# Patient Record
Sex: FEMALE | Race: WHITE | NOT HISPANIC OR LATINO | URBAN - METROPOLITAN AREA
[De-identification: names, ages, dates, MRNs, and addresses within clinical notes are randomized per-mention and may not be internally consistent; named-entity substitution may affect disease eponyms.]

---

## 2021-10-10 ENCOUNTER — WALK IN (OUTPATIENT)
Dept: URGENT CARE | Age: 21
End: 2021-10-10

## 2021-10-10 VITALS
BODY MASS INDEX: 30.35 KG/M2 | WEIGHT: 193.4 LBS | HEART RATE: 64 BPM | SYSTOLIC BLOOD PRESSURE: 102 MMHG | HEIGHT: 67 IN | DIASTOLIC BLOOD PRESSURE: 60 MMHG | TEMPERATURE: 98.5 F | RESPIRATION RATE: 18 BRPM

## 2021-10-10 DIAGNOSIS — R09.82 POST-NASAL DRIP: ICD-10-CM

## 2021-10-10 DIAGNOSIS — J02.9 SORE THROAT: Primary | ICD-10-CM

## 2021-10-10 LAB
HETEROPH AB SER QL: NEGATIVE
INTERNAL PROCEDURAL CONTROLS ACCEPTABLE: YES
S PYO AG THROAT QL IA.RAPID: NEGATIVE

## 2021-10-10 PROCEDURE — 86308 HETEROPHILE ANTIBODY SCREEN: CPT | Performed by: NURSE PRACTITIONER

## 2021-10-10 PROCEDURE — 87880 STREP A ASSAY W/OPTIC: CPT | Performed by: NURSE PRACTITIONER

## 2021-10-10 PROCEDURE — 99203 OFFICE O/P NEW LOW 30 MIN: CPT | Performed by: NURSE PRACTITIONER

## 2021-10-10 RX ORDER — ALBUTEROL SULFATE 90 UG/1
AEROSOL, METERED RESPIRATORY (INHALATION)
COMMUNITY

## 2021-10-10 RX ORDER — EPINEPHRINE 0.3 MG/.3ML
INJECTION SUBCUTANEOUS
COMMUNITY

## 2021-10-10 RX ORDER — FAMOTIDINE 40 MG/1
TABLET, FILM COATED ORAL
COMMUNITY
Start: 2021-09-02

## 2021-10-10 RX ORDER — CLONAZEPAM 1 MG/1
TABLET ORAL
COMMUNITY

## 2021-10-10 RX ORDER — METHYLPREDNISOLONE 4 MG/1
TABLET ORAL
COMMUNITY
Start: 2021-09-28

## 2021-10-10 RX ORDER — AZELASTINE 1 MG/ML
SPRAY, METERED NASAL
COMMUNITY

## 2021-10-10 RX ORDER — ESCITALOPRAM OXALATE 20 MG/1
TABLET ORAL
COMMUNITY

## 2021-10-10 RX ORDER — NEOMYCIN SULFATE, POLYMYXIN B SULFATE AND DEXAMETHASONE 3.5; 10000; 1 MG/ML; [USP'U]/ML; MG/ML
SUSPENSION/ DROPS OPHTHALMIC
COMMUNITY
Start: 2021-07-28

## 2021-10-10 ASSESSMENT — ENCOUNTER SYMPTOMS
SINUS PRESSURE: 1
SORE THROAT: 1
NEUROLOGICAL NEGATIVE: 1
GASTROINTESTINAL NEGATIVE: 1
EYES NEGATIVE: 1
RESPIRATORY NEGATIVE: 1
ENDOCRINE NEGATIVE: 1
FATIGUE: 1
HEMATOLOGIC/LYMPHATIC NEGATIVE: 1
FEVER: 0
PSYCHIATRIC NEGATIVE: 1

## 2024-11-18 RX ORDER — DEXTROAMPHETAMINE SACCHARATE, AMPHETAMINE ASPARTATE, DEXTROAMPHETAMINE SULFATE AND AMPHETAMINE SULFATE 1.25; 1.25; 1.25; 1.25 MG/1; MG/1; MG/1; MG/1
1 TABLET ORAL 2 TIMES DAILY PRN
COMMUNITY
Start: 2023-06-10

## 2024-11-18 RX ORDER — SPIRONOLACTONE 50 MG/1
50 TABLET, FILM COATED ORAL 2 TIMES DAILY
COMMUNITY
Start: 2024-04-04

## 2024-11-18 RX ORDER — FAMOTIDINE 40 MG/1
40 TABLET, FILM COATED ORAL NIGHTLY
COMMUNITY
Start: 2023-06-02

## 2024-11-18 RX ORDER — ESCITALOPRAM OXALATE 20 MG/1
40 TABLET ORAL NIGHTLY
COMMUNITY
Start: 2023-04-02

## 2024-11-18 RX ORDER — GABAPENTIN 400 MG/1
CAPSULE ORAL 3 TIMES DAILY
COMMUNITY
Start: 2024-09-19

## 2024-11-18 RX ORDER — TOPIRAMATE 50 MG/1
75 TABLET, FILM COATED ORAL DAILY
COMMUNITY

## 2024-11-18 RX ORDER — CYCLOBENZAPRINE HCL 10 MG
10 TABLET ORAL 3 TIMES DAILY PRN
COMMUNITY
Start: 2024-01-16

## 2024-11-18 RX ORDER — FLUTICASONE PROPIONATE 50 MCG
2 SPRAY, SUSPENSION (ML) NASAL 2 TIMES DAILY
COMMUNITY
Start: 2024-01-08

## 2024-11-18 RX ORDER — EUCALYPTUS/PEPPERMINT OIL
1-2 SOLUTION, NON-ORAL NASAL DAILY PRN
COMMUNITY
Start: 2023-12-19

## 2024-11-18 RX ORDER — CLONAZEPAM 1 MG/1
1 TABLET ORAL NIGHTLY
COMMUNITY
Start: 2024-03-29

## 2024-11-19 ENCOUNTER — LAB ENCOUNTER (OUTPATIENT)
Dept: LAB | Facility: HOSPITAL | Age: 24
End: 2024-11-19
Attending: Other
Payer: COMMERCIAL

## 2024-11-19 DIAGNOSIS — Z01.818 PREOP TESTING: ICD-10-CM

## 2024-11-19 LAB
ANTIBODY SCREEN: NEGATIVE
RH BLOOD TYPE: POSITIVE

## 2024-11-19 PROCEDURE — 86850 RBC ANTIBODY SCREEN: CPT

## 2024-11-19 PROCEDURE — 87641 MR-STAPH DNA AMP PROBE: CPT

## 2024-11-19 PROCEDURE — 86901 BLOOD TYPING SEROLOGIC RH(D): CPT

## 2024-11-19 PROCEDURE — 86900 BLOOD TYPING SEROLOGIC ABO: CPT

## 2024-11-19 PROCEDURE — 36415 COLL VENOUS BLD VENIPUNCTURE: CPT

## 2024-11-20 LAB — MRSA DNA SPEC QL NAA+PROBE: NEGATIVE

## 2024-11-21 ENCOUNTER — ANESTHESIA EVENT (OUTPATIENT)
Dept: SURGERY | Facility: HOSPITAL | Age: 24
End: 2024-11-21
Payer: COMMERCIAL

## 2024-11-21 ENCOUNTER — HOSPITAL ENCOUNTER (OUTPATIENT)
Facility: HOSPITAL | Age: 24
Discharge: HOME | End: 2024-11-24
Attending: ORTHOPAEDIC SURGERY | Admitting: ORTHOPAEDIC SURGERY
Payer: COMMERCIAL

## 2024-11-21 ENCOUNTER — APPOINTMENT (OUTPATIENT)
Dept: GENERAL RADIOLOGY | Facility: HOSPITAL | Age: 24
End: 2024-11-21
Attending: ORTHOPAEDIC SURGERY
Payer: COMMERCIAL

## 2024-11-21 ENCOUNTER — ANESTHESIA (OUTPATIENT)
Dept: SURGERY | Facility: HOSPITAL | Age: 24
End: 2024-11-21
Payer: COMMERCIAL

## 2024-11-21 DIAGNOSIS — Z01.818 PREOP TESTING: Primary | ICD-10-CM

## 2024-11-21 PROBLEM — M48.062 SPINAL STENOSIS OF LUMBAR REGION WITH NEUROGENIC CLAUDICATION: Status: ACTIVE | Noted: 2024-11-21

## 2024-11-21 PROBLEM — F41.9 ANXIETY: Status: ACTIVE | Noted: 2024-11-21

## 2024-11-21 PROBLEM — Z98.1 S/P LUMBAR SPINAL FUSION: Status: ACTIVE | Noted: 2024-11-21

## 2024-11-21 LAB
B-HCG UR QL: NEGATIVE
RH BLOOD TYPE: POSITIVE

## 2024-11-21 PROCEDURE — 76000 FLUOROSCOPY <1 HR PHYS/QHP: CPT | Performed by: ORTHOPAEDIC SURGERY

## 2024-11-21 PROCEDURE — 50715 RELEASE OF URETER: CPT | Performed by: SURGERY

## 2024-11-21 PROCEDURE — 99204 OFFICE O/P NEW MOD 45 MIN: CPT | Performed by: HOSPITALIST

## 2024-11-21 PROCEDURE — 0SG30J1 FUSION OF LUMBOSACRAL JOINT WITH SYNTHETIC SUBSTITUTE, POSTERIOR APPROACH, POSTERIOR COLUMN, OPEN APPROACH: ICD-10-PCS | Performed by: ORTHOPAEDIC SURGERY

## 2024-11-21 PROCEDURE — 22558 ARTHRD ANT NTRBD MIN DSC LUM: CPT | Performed by: SURGERY

## 2024-11-21 PROCEDURE — 0SG30A0 FUSION OF LUMBOSACRAL JOINT WITH INTERBODY FUSION DEVICE, ANTERIOR APPROACH, ANTERIOR COLUMN, OPEN APPROACH: ICD-10-PCS | Performed by: ORTHOPAEDIC SURGERY

## 2024-11-21 PROCEDURE — 0ST40ZZ RESECTION OF LUMBOSACRAL DISC, OPEN APPROACH: ICD-10-PCS | Performed by: ORTHOPAEDIC SURGERY

## 2024-11-21 PROCEDURE — 0TN70ZZ RELEASE LEFT URETER, OPEN APPROACH: ICD-10-PCS | Performed by: SURGERY

## 2024-11-21 DEVICE — K WIRE FIX NIT BVL BLNT TIP PRECEPT: Type: IMPLANTABLE DEVICE | Site: BACK

## 2024-11-21 DEVICE — GRAFT BNE SUB MTRX C OSTEOCEL PRO: Type: IMPLANTABLE DEVICE | Site: BACK | Status: FUNCTIONAL

## 2024-11-21 DEVICE — IMPLANTABLE DEVICE: Type: IMPLANTABLE DEVICE | Site: BACK | Status: FUNCTIONAL

## 2024-11-21 DEVICE — SCREW SPNL 6.5X45MM 2C REDUC POLYAX RELINE: Type: IMPLANTABLE DEVICE | Site: BACK | Status: FUNCTIONAL

## 2024-11-21 DEVICE — SCREW SPNL 6.5X40MM 2C REDUC POLYAX RELINE: Type: IMPLANTABLE DEVICE | Site: BACK | Status: FUNCTIONAL

## 2024-11-21 DEVICE — SCREW SPNL 5.5MM LOK OPN TULIP RELINE: Type: IMPLANTABLE DEVICE | Site: BACK | Status: FUNCTIONAL

## 2024-11-21 DEVICE — ROD SPNL 5.5X30MM POST: Type: IMPLANTABLE DEVICE | Site: BACK | Status: FUNCTIONAL

## 2024-11-21 DEVICE — SCREW SPNL 5.5X22MM ANT THORLUM TI BRIGADE: Type: IMPLANTABLE DEVICE | Site: BACK | Status: FUNCTIONAL

## 2024-11-21 RX ORDER — HYDROMORPHONE HYDROCHLORIDE 1 MG/ML
0.2 INJECTION, SOLUTION INTRAMUSCULAR; INTRAVENOUS; SUBCUTANEOUS EVERY 5 MIN PRN
Status: DISCONTINUED | OUTPATIENT
Start: 2024-11-21 | End: 2024-11-21 | Stop reason: HOSPADM

## 2024-11-21 RX ORDER — CYCLOBENZAPRINE HCL 10 MG
10 TABLET ORAL 3 TIMES DAILY PRN
Status: DISCONTINUED | OUTPATIENT
Start: 2024-11-21 | End: 2024-11-24

## 2024-11-21 RX ORDER — ONDANSETRON 2 MG/ML
4 INJECTION INTRAMUSCULAR; INTRAVENOUS EVERY 6 HOURS PRN
Status: DISCONTINUED | OUTPATIENT
Start: 2024-11-21 | End: 2024-11-24

## 2024-11-21 RX ORDER — HYDROMORPHONE HYDROCHLORIDE 1 MG/ML
0.8 INJECTION, SOLUTION INTRAMUSCULAR; INTRAVENOUS; SUBCUTANEOUS EVERY 2 HOUR PRN
Status: DISCONTINUED | OUTPATIENT
Start: 2024-11-21 | End: 2024-11-24

## 2024-11-21 RX ORDER — ROCURONIUM BROMIDE 10 MG/ML
INJECTION, SOLUTION INTRAVENOUS AS NEEDED
Status: DISCONTINUED | OUTPATIENT
Start: 2024-11-21 | End: 2024-11-21 | Stop reason: SURG

## 2024-11-21 RX ORDER — PHENYLEPHRINE HCL 10 MG/ML
VIAL (ML) INJECTION AS NEEDED
Status: DISCONTINUED | OUTPATIENT
Start: 2024-11-21 | End: 2024-11-21 | Stop reason: SURG

## 2024-11-21 RX ORDER — METHOCARBAMOL 100 MG/ML
500 INJECTION, SOLUTION INTRAMUSCULAR; INTRAVENOUS ONCE
Status: COMPLETED | OUTPATIENT
Start: 2024-11-21 | End: 2024-11-21

## 2024-11-21 RX ORDER — LIDOCAINE HYDROCHLORIDE 10 MG/ML
INJECTION, SOLUTION EPIDURAL; INFILTRATION; INTRACAUDAL; PERINEURAL AS NEEDED
Status: DISCONTINUED | OUTPATIENT
Start: 2024-11-21 | End: 2024-11-21 | Stop reason: SURG

## 2024-11-21 RX ORDER — ACETAMINOPHEN 500 MG
1000 TABLET ORAL EVERY 6 HOURS PRN
COMMUNITY

## 2024-11-21 RX ORDER — BISACODYL 10 MG
10 SUPPOSITORY, RECTAL RECTAL
Status: DISCONTINUED | OUTPATIENT
Start: 2024-11-21 | End: 2024-11-24

## 2024-11-21 RX ORDER — MORPHINE SULFATE 4 MG/ML
2 INJECTION, SOLUTION INTRAMUSCULAR; INTRAVENOUS EVERY 10 MIN PRN
Status: DISCONTINUED | OUTPATIENT
Start: 2024-11-21 | End: 2024-11-21 | Stop reason: HOSPADM

## 2024-11-21 RX ORDER — DOCUSATE SODIUM 100 MG/1
100 CAPSULE, LIQUID FILLED ORAL 2 TIMES DAILY
Status: DISCONTINUED | OUTPATIENT
Start: 2024-11-21 | End: 2024-11-24

## 2024-11-21 RX ORDER — TOPIRAMATE 25 MG/1
25 TABLET, FILM COATED ORAL EVERY MORNING
Status: DISCONTINUED | OUTPATIENT
Start: 2024-11-22 | End: 2024-11-24

## 2024-11-21 RX ORDER — OXYCODONE HYDROCHLORIDE 5 MG/1
5 TABLET ORAL EVERY 4 HOURS PRN
Status: DISCONTINUED | OUTPATIENT
Start: 2024-11-21 | End: 2024-11-24

## 2024-11-21 RX ORDER — ONDANSETRON 2 MG/ML
4 INJECTION INTRAMUSCULAR; INTRAVENOUS EVERY 6 HOURS PRN
Status: DISCONTINUED | OUTPATIENT
Start: 2024-11-21 | End: 2024-11-21 | Stop reason: HOSPADM

## 2024-11-21 RX ORDER — FAMOTIDINE 20 MG/1
40 TABLET, FILM COATED ORAL NIGHTLY
Status: DISCONTINUED | OUTPATIENT
Start: 2024-11-21 | End: 2024-11-24

## 2024-11-21 RX ORDER — NALOXONE HYDROCHLORIDE 0.4 MG/ML
0.08 INJECTION, SOLUTION INTRAMUSCULAR; INTRAVENOUS; SUBCUTANEOUS AS NEEDED
Status: DISCONTINUED | OUTPATIENT
Start: 2024-11-21 | End: 2024-11-21 | Stop reason: HOSPADM

## 2024-11-21 RX ORDER — CLONAZEPAM 1 MG/1
1 TABLET ORAL NIGHTLY PRN
Status: DISCONTINUED | OUTPATIENT
Start: 2024-11-21 | End: 2024-11-24

## 2024-11-21 RX ORDER — SODIUM CHLORIDE, SODIUM LACTATE, POTASSIUM CHLORIDE, CALCIUM CHLORIDE 600; 310; 30; 20 MG/100ML; MG/100ML; MG/100ML; MG/100ML
INJECTION, SOLUTION INTRAVENOUS CONTINUOUS
Status: DISCONTINUED | OUTPATIENT
Start: 2024-11-21 | End: 2024-11-21 | Stop reason: HOSPADM

## 2024-11-21 RX ORDER — DEXAMETHASONE SODIUM PHOSPHATE 4 MG/ML
VIAL (ML) INJECTION AS NEEDED
Status: DISCONTINUED | OUTPATIENT
Start: 2024-11-21 | End: 2024-11-21 | Stop reason: SURG

## 2024-11-21 RX ORDER — MORPHINE SULFATE 4 MG/ML
4 INJECTION, SOLUTION INTRAMUSCULAR; INTRAVENOUS EVERY 10 MIN PRN
Status: DISCONTINUED | OUTPATIENT
Start: 2024-11-21 | End: 2024-11-21 | Stop reason: HOSPADM

## 2024-11-21 RX ORDER — PROCHLORPERAZINE EDISYLATE 5 MG/ML
5 INJECTION INTRAMUSCULAR; INTRAVENOUS EVERY 8 HOURS PRN
Status: DISCONTINUED | OUTPATIENT
Start: 2024-11-21 | End: 2024-11-21 | Stop reason: HOSPADM

## 2024-11-21 RX ORDER — HYDROMORPHONE HYDROCHLORIDE 1 MG/ML
0.4 INJECTION, SOLUTION INTRAMUSCULAR; INTRAVENOUS; SUBCUTANEOUS EVERY 2 HOUR PRN
Status: DISCONTINUED | OUTPATIENT
Start: 2024-11-21 | End: 2024-11-24

## 2024-11-21 RX ORDER — SPIRONOLACTONE 25 MG/1
50 TABLET ORAL 2 TIMES DAILY
Status: DISCONTINUED | OUTPATIENT
Start: 2024-11-21 | End: 2024-11-24

## 2024-11-21 RX ORDER — HYDROMORPHONE HYDROCHLORIDE 1 MG/ML
0.4 INJECTION, SOLUTION INTRAMUSCULAR; INTRAVENOUS; SUBCUTANEOUS EVERY 5 MIN PRN
Status: DISCONTINUED | OUTPATIENT
Start: 2024-11-21 | End: 2024-11-21 | Stop reason: HOSPADM

## 2024-11-21 RX ORDER — DIAZEPAM 5 MG/1
5 TABLET ORAL EVERY 6 HOURS PRN
Status: DISCONTINUED | OUTPATIENT
Start: 2024-11-21 | End: 2024-11-24

## 2024-11-21 RX ORDER — ONDANSETRON 2 MG/ML
INJECTION INTRAMUSCULAR; INTRAVENOUS AS NEEDED
Status: DISCONTINUED | OUTPATIENT
Start: 2024-11-21 | End: 2024-11-21 | Stop reason: SURG

## 2024-11-21 RX ORDER — TIZANIDINE 2 MG/1
4 TABLET ORAL EVERY 8 HOURS PRN
Status: DISCONTINUED | OUTPATIENT
Start: 2024-11-21 | End: 2024-11-21 | Stop reason: ALTCHOICE

## 2024-11-21 RX ORDER — TOPIRAMATE 25 MG/1
50 TABLET, FILM COATED ORAL NIGHTLY
Status: DISCONTINUED | OUTPATIENT
Start: 2024-11-21 | End: 2024-11-24

## 2024-11-21 RX ORDER — PROCHLORPERAZINE EDISYLATE 5 MG/ML
5 INJECTION INTRAMUSCULAR; INTRAVENOUS EVERY 8 HOURS PRN
Status: DISCONTINUED | OUTPATIENT
Start: 2024-11-21 | End: 2024-11-24

## 2024-11-21 RX ORDER — ALBUTEROL SULFATE 0.83 MG/ML
2.5 SOLUTION RESPIRATORY (INHALATION) AS NEEDED
Status: DISCONTINUED | OUTPATIENT
Start: 2024-11-21 | End: 2024-11-21 | Stop reason: HOSPADM

## 2024-11-21 RX ORDER — DIPHENHYDRAMINE HYDROCHLORIDE 50 MG/ML
25 INJECTION INTRAMUSCULAR; INTRAVENOUS EVERY 4 HOURS PRN
Status: DISCONTINUED | OUTPATIENT
Start: 2024-11-21 | End: 2024-11-24

## 2024-11-21 RX ORDER — OXYCODONE HYDROCHLORIDE 5 MG/1
10 TABLET ORAL EVERY 4 HOURS PRN
Status: DISCONTINUED | OUTPATIENT
Start: 2024-11-21 | End: 2024-11-24

## 2024-11-21 RX ORDER — ESCITALOPRAM OXALATE 20 MG/1
40 TABLET ORAL NIGHTLY
Status: DISCONTINUED | OUTPATIENT
Start: 2024-11-21 | End: 2024-11-24

## 2024-11-21 RX ORDER — SODIUM CHLORIDE 9 MG/ML
INJECTION, SOLUTION INTRAVENOUS CONTINUOUS PRN
Status: DISCONTINUED | OUTPATIENT
Start: 2024-11-21 | End: 2024-11-21 | Stop reason: SURG

## 2024-11-21 RX ORDER — HYDROMORPHONE HYDROCHLORIDE 1 MG/ML
0.6 INJECTION, SOLUTION INTRAMUSCULAR; INTRAVENOUS; SUBCUTANEOUS EVERY 5 MIN PRN
Status: DISCONTINUED | OUTPATIENT
Start: 2024-11-21 | End: 2024-11-21 | Stop reason: HOSPADM

## 2024-11-21 RX ORDER — FLUTICASONE PROPIONATE 50 MCG
2 SPRAY, SUSPENSION (ML) NASAL 2 TIMES DAILY
Status: DISCONTINUED | OUTPATIENT
Start: 2024-11-21 | End: 2024-11-24

## 2024-11-21 RX ORDER — SENNOSIDES 8.6 MG
17.2 TABLET ORAL NIGHTLY
Status: DISCONTINUED | OUTPATIENT
Start: 2024-11-21 | End: 2024-11-24

## 2024-11-21 RX ORDER — SCOLOPAMINE TRANSDERMAL SYSTEM 1 MG/1
1 PATCH, EXTENDED RELEASE TRANSDERMAL ONCE
Status: COMPLETED | OUTPATIENT
Start: 2024-11-21 | End: 2024-11-24

## 2024-11-21 RX ORDER — DIPHENHYDRAMINE HCL 25 MG
25 CAPSULE ORAL EVERY 4 HOURS PRN
Status: DISCONTINUED | OUTPATIENT
Start: 2024-11-21 | End: 2024-11-24

## 2024-11-21 RX ORDER — POLYETHYLENE GLYCOL 3350 17 G/17G
17 POWDER, FOR SOLUTION ORAL DAILY PRN
Status: DISCONTINUED | OUTPATIENT
Start: 2024-11-21 | End: 2024-11-24

## 2024-11-21 RX ORDER — MIDAZOLAM HYDROCHLORIDE 1 MG/ML
INJECTION INTRAMUSCULAR; INTRAVENOUS AS NEEDED
Status: DISCONTINUED | OUTPATIENT
Start: 2024-11-21 | End: 2024-11-21 | Stop reason: SURG

## 2024-11-21 RX ORDER — SODIUM CHLORIDE, SODIUM LACTATE, POTASSIUM CHLORIDE, CALCIUM CHLORIDE 600; 310; 30; 20 MG/100ML; MG/100ML; MG/100ML; MG/100ML
INJECTION, SOLUTION INTRAVENOUS CONTINUOUS
Status: DISCONTINUED | OUTPATIENT
Start: 2024-11-21 | End: 2024-11-24

## 2024-11-21 RX ORDER — SODIUM PHOSPHATE, DIBASIC AND SODIUM PHOSPHATE, MONOBASIC 7; 19 G/230ML; G/230ML
1 ENEMA RECTAL ONCE AS NEEDED
Status: DISCONTINUED | OUTPATIENT
Start: 2024-11-21 | End: 2024-11-24

## 2024-11-21 RX ORDER — MORPHINE SULFATE 10 MG/ML
6 INJECTION, SOLUTION INTRAMUSCULAR; INTRAVENOUS EVERY 10 MIN PRN
Status: DISCONTINUED | OUTPATIENT
Start: 2024-11-21 | End: 2024-11-21 | Stop reason: HOSPADM

## 2024-11-21 RX ORDER — ACETAMINOPHEN 500 MG
1000 TABLET ORAL ONCE
Status: COMPLETED | OUTPATIENT
Start: 2024-11-21 | End: 2024-11-21

## 2024-11-21 RX ORDER — BUPIVACAINE HYDROCHLORIDE AND EPINEPHRINE 2.5; 5 MG/ML; UG/ML
INJECTION, SOLUTION INFILTRATION; PERINEURAL AS NEEDED
Status: DISCONTINUED | OUTPATIENT
Start: 2024-11-21 | End: 2024-11-21 | Stop reason: HOSPADM

## 2024-11-21 RX ADMIN — ROCURONIUM BROMIDE 30 MG: 10 INJECTION, SOLUTION INTRAVENOUS at 08:17:00

## 2024-11-21 RX ADMIN — PHENYLEPHRINE HCL 100 MCG: 10 MG/ML VIAL (ML) INJECTION at 10:01:00

## 2024-11-21 RX ADMIN — DEXAMETHASONE SODIUM PHOSPHATE 8 MG: 4 MG/ML VIAL (ML) INJECTION at 07:50:00

## 2024-11-21 RX ADMIN — SODIUM CHLORIDE, SODIUM LACTATE, POTASSIUM CHLORIDE, CALCIUM CHLORIDE: 600; 310; 30; 20 INJECTION, SOLUTION INTRAVENOUS at 10:38:00

## 2024-11-21 RX ADMIN — PHENYLEPHRINE HCL 100 MCG: 10 MG/ML VIAL (ML) INJECTION at 08:48:00

## 2024-11-21 RX ADMIN — PHENYLEPHRINE HCL 100 MCG: 10 MG/ML VIAL (ML) INJECTION at 07:58:00

## 2024-11-21 RX ADMIN — SODIUM CHLORIDE: 9 INJECTION, SOLUTION INTRAVENOUS at 07:44:00

## 2024-11-21 RX ADMIN — MIDAZOLAM HYDROCHLORIDE 2 MG: 1 INJECTION INTRAMUSCULAR; INTRAVENOUS at 07:32:00

## 2024-11-21 RX ADMIN — ROCURONIUM BROMIDE 70 MG: 10 INJECTION, SOLUTION INTRAVENOUS at 07:39:00

## 2024-11-21 RX ADMIN — LIDOCAINE HYDROCHLORIDE 40 MG: 10 INJECTION, SOLUTION EPIDURAL; INFILTRATION; INTRACAUDAL; PERINEURAL at 07:38:00

## 2024-11-21 RX ADMIN — SODIUM CHLORIDE, SODIUM LACTATE, POTASSIUM CHLORIDE, CALCIUM CHLORIDE: 600; 310; 30; 20 INJECTION, SOLUTION INTRAVENOUS at 11:02:00

## 2024-11-21 RX ADMIN — ROCURONIUM BROMIDE 20 MG: 10 INJECTION, SOLUTION INTRAVENOUS at 08:58:00

## 2024-11-21 RX ADMIN — ONDANSETRON 4 MG: 2 INJECTION INTRAMUSCULAR; INTRAVENOUS at 10:37:00

## 2024-11-21 RX ADMIN — PHENYLEPHRINE HCL 100 MCG: 10 MG/ML VIAL (ML) INJECTION at 08:08:00

## 2024-11-21 NOTE — PLAN OF CARE
Pt a/o x4. On RA. Mother at bedside. Oriented to unit. Voiding freely, up with one assist and RW. Two incisions to back CDI, telfa tegaderm. Abdominal incision CDI with telfa and tegaderm. Gel ice packs to back. TEDs and SCDs on bilaterally. Plan to discharge home with parents.      Problem: SKIN/TISSUE INTEGRITY - ADULT  Goal: Skin integrity remains intact  Description: INTERVENTIONS  - Assess and document risk factors for pressure ulcer development  - Assess and document skin integrity  - Monitor for areas of redness and/or skin breakdown  - Initiate interventions, skin care algorithm/standards of care as needed  Outcome: Progressing  Goal: Incision(s), wounds(s) or drain site(s) healing without S/S of infection  Description: INTERVENTIONS:  - Assess and document risk factors for pressure ulcer development  - Assess and document skin integrity  - Assess and document dressing/incision, wound bed, drain sites and surrounding tissue  - Implement wound care per orders  - Initiate isolation precautions as appropriate  - Initiate Pressure Ulcer prevention bundle as indicated  Outcome: Progressing     Problem: MUSCULOSKELETAL - ADULT  Goal: Return mobility to safest level of function  Description: INTERVENTIONS:  - Assess patient stability and activity tolerance for standing, transferring and ambulating w/ or w/o assistive devices  - Assist with transfers and ambulation using safe patient handling equipment as needed  - Ensure adequate protection for wounds/incisions during mobilization  - Obtain PT/OT consults as needed  - Advance activity as appropriate  - Communicate ordered activity level and limitations with patient/family  Outcome: Progressing  Goal: Maintain proper alignment of affected body part  Description: INTERVENTIONS:  - Support and protect limb and body alignment per provider's orders  - Instruct and reinforce with patient and family use of appropriate assistive device and precautions (e.g. spinal or hip  dislocation precautions)  Outcome: Progressing     Problem: Impaired Functional Mobility  Goal: Achieve highest/safest level of mobility/gait  Description: Interventions:  - Assess patient's functional ability and stability  - Promote increasing activity/tolerance for mobility and gait  - Educate and engage patient/family in tolerated activity level and precautions    Outcome: Progressing     Problem: PAIN - ADULT  Goal: Verbalizes/displays adequate comfort level or patient's stated pain goal  Description: INTERVENTIONS:  - Encourage pt to monitor pain and request assistance  - Assess pain using appropriate pain scale  - Administer analgesics based on type and severity of pain and evaluate response  - Implement non-pharmacological measures as appropriate and evaluate response  - Consider cultural and social influences on pain and pain management  - Manage/alleviate anxiety  - Utilize distraction and/or relaxation techniques  - Monitor for opioid side effects  - Notify MD/LIP if interventions unsuccessful or patient reports new pain  - Anticipate increased pain with activity and pre-medicate as appropriate  Outcome: Progressing     Problem: SAFETY ADULT - FALL  Goal: Free from fall injury  Description: INTERVENTIONS:  - Assess pt frequently for physical needs  - Identify cognitive and physical deficits and behaviors that affect risk of falls.  - [place] fall precautions as indicated by assessment.  - Educate pt/family on patient safety including physical limitations  - Instruct pt to call for assistance with activity based on assessment  - Modify environment to reduce risk of injury  - Provide assistive devices as appropriate  - Consider OT/PT consult to assist with strengthening/mobility  - Encourage toileting schedule  Outcome: Progressing     Problem: DISCHARGE PLANNING  Goal: Discharge to home or other facility with appropriate resources  Description: INTERVENTIONS:  - Identify barriers to discharge w/pt and  caregiver  - Include patient/family/discharge partner in discharge planning  - Arrange for needed discharge resources and transportation as appropriate  - Identify discharge learning needs (meds, wound care, etc)  - Arrange for interpreters to assist at discharge as needed  - Consider post-discharge preferences of patient/family/discharge partner  - Complete POLST form as appropriate  - Assess patient's ability to be responsible for managing their own health  - Refer to Case Management Department for coordinating discharge planning if the patient needs post-hospital services based on physician/LIP order or complex needs related to functional status, cognitive ability or social support system  Outcome: Progressing

## 2024-11-21 NOTE — ANESTHESIA POSTPROCEDURE EVALUATION
Patient: Tiffanie Graham    Procedure Summary       Date: 11/21/24 Room / Location: [facility] MAIN OR  / [facility] MAIN OR    Anesthesia Start: 0732 Anesthesia Stop: 1103    Procedures:       L5-S1 anterior lumbar interbody fusion with posterior spinal fusion (Spine Lumbar)      POSTERIOR LUMBAR LAMINECT SPINAL FUS W/INSTR 1 LEV (Spine Lumbar) Diagnosis: (Lumbar spondylolisthesis)    Surgeons: Oswaldo Mcdaniel MD Anesthesiologist: Clint Bridges MD    Anesthesia Type: general ASA Status: 2            Anesthesia Type: general    Vitals Value Taken Time   /71 11/21/24 1201   Temp 97.9 °F (36.6 °C) 11/21/24 1101   Pulse 79 11/21/24 1206   Resp 10 11/21/24 1206   SpO2 100 % 11/21/24 1206   Vitals shown include unfiled device data.    [facility] AN Post Evaluation:   Patient Evaluated in PACU  Patient Participation: complete - patient participated  Level of Consciousness: awake  Pain Score: 2  Pain Management: adequate  Airway Patency:patent  Dental exam unchanged from preop  Yes    Nausea/Vomiting: none  Cardiovascular Status: acceptable  Respiratory Status: acceptable  Postoperative Hydration acceptable      Clint Bridges MD  11/21/2024 12:06 PM

## 2024-11-21 NOTE — OPERATIVE REPORT
[facility]  part of [facility]     Operative Report    Patient Name:  Tiffanie Graham  MR:  [id number]  :  2000  DOS:  23    Preop Dx:  Lumbar spondylolisthesis  Postop Dx:  Lumbar spondylolisthesis    Procedure:    Anterior lumbar interbody fusion L5 to S1 (25325-75)  Ureterolysis (70842)  Real-time intraoperative C-arm fluoroscopy with image guidance and surgeon interpretation (11159)    Surgeons:  Merrick Valderrama MD, Derrick Mcdaniel MD  PA: Gustavo Boateng PA-C; Lona Simon PA-C,   EBL: 50 ml  Complications:  None.     Description:    The patient is a 24 year old female who is undergoing an ALIF with Dr. Derrick Mcdaniel.  General surgery was asked to assist in exposing the anterior disc space.    The patient was taken to the OR and placed in supine position.  General anesthesia was established and the abdomen was prepped in standard fashion.  Fluoroscopy was used to pillo the L5-S1 level.  A transverse incision was made over the left lower quadrant.  The anterior left rectus fascia was incised and the left rectus muscle was mobilized medially.  A vertical incision was made in the tranversalis fascia and the retroperitoneum was entered.  The peritoneum and left ureter were identified and mobilized medially.  The left ureter was freed from the retroperitoneum and left iliac vessels.  We were able to mobilize the left ureter to the right of the disc space.  Left ureterolysis was performed safely and no injury was seen.  Just medial to the left iliac vein, we dissected the prevertebral space.  The median sacral vessels were divided to further expose the L5-S1 disc space.  The retractors were placed to expose the anterior disc space.  Fluoroscopy was used to confirm the L5-S1 disc level.  The case was turned over to Dr. Mcdaniel for the discectomy and implant placement.    After fluoroscopic confirmation of the implant placement, the operative field was irrigated with saline.  Adequate  hemostasis was obtained.  The retractors were slowly removed and the operative field remained hemostatic.  There were no injury to the left ureter, peritoneal contents or the sympathetic chain during our dissection.  A left transversus abdominus plane block was performed using 0.5% marcaine.  The anterior fascia was closed using 0 looped PDS.  We closed the subcutaneous tissue and skin. All instrument and sponge counts were correct.  I was present to expose the anterior disc space.    Merrick Valderrama MD

## 2024-11-21 NOTE — BRIEF OP NOTE
Pre-Operative Diagnosis: Lumbar spondylolisthesis     Post-Operative Diagnosis: Lumbar spondylolisthesis      Procedure Performed:   L5-S1 anterior lumbar interbody fusion with posterior spinal fusion    Surgeons and Role:     * Oswaldo Mcdaniel MD - Primary     * Merrick Valderrama MD - Assisting Surgeon     * Ari Higgins MD - Assisting Surgeon    Assistant(s):  PA: Gustavo Boateng PA-C; Lona Simon PA-C     Surgical Findings: L5-S1 A/P LDF     Specimen: none     Estimated Blood Loss: No data recorded      Ari Higgins MD  11/21/2024  10:55 AM

## 2024-11-21 NOTE — OR NURSING
Upon emptying the josé bag it was noted the urine appearance was yellow and cloudy. ISHAAN CABALELRO and Anesthesiologist Yuliana witnessed urine appearance. MD notified.

## 2024-11-21 NOTE — CONSULTS
[facility]    PATIENT'S NAME: KAYLYN JUNIOR   ATTENDING PHYSICIAN: Oswaldo Mcdaniel MD   CONSULTING PHYSICIAN: Kyree Lomas MD   PATIENT ACCOUNT#:   [id number]    LOCATION:  [facility] PACU 10 [facility] 10  MEDICAL RECORD #:   [id number]       YOB: 2000  ADMISSION DATE:       11/21/2024      CONSULT DATE:  11/21/2024    REPORT OF CONSULTATION      REASON FOR ADMISSION:  Post anterior and posterior lumbar interbody fusion.    HISTORY OF PRESENT ILLNESS:  The patient is a 24-year-old  female with chronic back pain, lumbar anterolisthesis, and neurogenic claudication.  Failed outpatient conservative medical management options.  Scheduled today for above-mentioned procedure by her spine surgeon, Dr. Mcdaniel.  Postoperatively transferred to PACU for further monitoring.    PAST MEDICAL HISTORY:  Degenerative joint disease of lumbar spine, anxiety, and asthma.    PAST SURGICAL HISTORY:  Craniotomy for neuroblastoma resection, right wrist reconstruction, hemithyroidectomy, and tonsillectomy.    MEDICATIONS:  Please see medication reconciliation list.     ALLERGIES:  Symbicort and chlorthalidone.     SOCIAL HISTORY:  No tobacco or drug use.  Social alcohol.  Lives with her family.  Independent for basic activities of daily living.     FAMILY HISTORY:  Father had heart disease and diabetes mellitus type 2.    REVIEW OF SYSTEMS:  Currently resting in bed.  Back and lower abdominal discomfort.  No chest pain, no shortness of breath.  Other 12-point review of systems is negative.        PHYSICAL EXAMINATION:    GENERAL:  Alert and oriented to time, place, and person.  Mild to moderate distress.    VITAL SIGNS:  Temperature 97.9, pulse 77, respiratory rate 10, blood pressure 115/70, pulse ox 100% on 2L nasal cannula oxygen.  HEENT:  Atraumatic.  Oropharynx clear.  Dry mucous membranes.  Ears and nose normal.  Eyes:  Anicteric sclerae.  NECK:  Supple.  No lymphadenopathy.  Trachea midline.  Full  range of motion.  LUNGS:  Clear to auscultation bilaterally.  Normal respiratory effort.    HEART:  Regular rate and rhythm.  S1 and S2 auscultated.  No murmur.  ABDOMEN:  Soft, nondistended.  No tenderness.  Positive bowel sounds.  Lower abdominal dressing and lumbar area dressing.  EXTREMITIES:  No peripheral edema, clubbing, or cyanosis.  NEUROLOGIC:  Motor and sensory intact.  Cranial nerves II through XII are intact.    ASSESSMENT AND PLAN:    1.   Lumbar spondylolisthesis, status post L5-S1 anterior lumbar interbody fusion with posterior spinal fusion.  Pain control.  Neuro checks.  DVT prophylaxis.  Physical and occupational therapy.  2.   Anxiety disorder.  Continue home medications.     Dictated By Kyree Lomas MD  d: 11/21/2024 12:17:52  t: 11/21/2024 12:58:09  Job [id number]  FB/    cc: Oswaldo Mcdaniel MD

## 2024-11-21 NOTE — ANESTHESIA PREPROCEDURE EVALUATION
Anesthesia PreOp Note    HPI:     Tiffanie Graham is a 24 year old female who presents for preoperative consultation requested by: Oswaldo Mcdaniel MD    Date of Surgery: 11/21/2024    Procedure(s):  L5-S1 anterior lumbar interbody fusion with posterior spinal fusion  POSTERIOR LUMBAR LAMINECT SPINAL FUS W/INSTR 1 LEV  Indication: Lumbar spondylolisthesis    Relevant Problems   No relevant active problems       NPO:  Last Liquid Consumption Date: 11/20/24  Last Liquid Consumption Time: 2000  Last Solid Consumption Date: 11/20/24  Last Solid Consumption Time: 1900  Last Liquid Consumption Date: 11/20/24          History Review:  There are no active problems to display for this patient.      Past Medical History:    Anxiety state    GUDELIA    Asthma (HCC)    Back problem    Unknown etiology    Cancer (HCC)    [place] Neuroblastoma    Disorder of thyroid    Exposure to medical diagnostic radiation    Completed course treatment    Muscle weakness    Due to back problems    Personal history of antineoplastic chemotherapy    Completes course treatment    PONV (postoperative nausea and vomiting)       Past Surgical History:   Procedure Laterality Date    Adenoidectomy  2004    T & A    Brain surgery  2015    Nasal Cavity Tumor Resection (multiple surgeries)    Eye surgery Bilateral 2015    Aguilar Tube Placement (2015, 2016, and 2018)    Hc percut portal vein catheterization any method  2015    Port-a-Catheter Placement    Hc percut portal vein catheterization any method  08/2016    Port Removal    Hc surgery catheter eps dx/ablation other than 3d c1733 Right 2021    Right Wrist Cartilage Reconstruction    Other surgical history  11/1999    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)    Thyroidectomy  2015    Tonsillectomy  2004    T & A    [place] teeth removed  11/2015    [place] Teeth Extraction x4       Prescriptions Prior to Admission[1]  Current Medications and Prescriptions Ordered in Epic[2]    Allergies[3]    Family  History   Problem Relation Age of Onset    Heart Disorder Father     Diabetes Father     Other (Parkinsons Disorder) Father     Bipolar Disorder Father     Thyroid Disorder Mother     Obesity Brother      Social History     Socioeconomic History    Marital status: Single   Tobacco Use    Smoking status: Never   Vaping Use    Vaping status: Never Used   Substance and Sexual Activity    Alcohol use: Yes     Comment: social    Drug use: Yes     Comment: THC 1-2 times per month       Available pre-op labs reviewed.  Lab Results   Component Value Date    URINEPREG Negative 11/21/2024             Vital Signs:  Body mass index is 28.35 kg/m².   height is 1.702 m (5' 7\") and weight is 82.1 kg (181 lb). Her oral temperature is 97.9 °F (36.6 °C). Her blood pressure is 124/92 (abnormal) and her pulse is 96. Her respiration is 16 and oxygen saturation is 98%.   Vitals:    11/18/24 0927 11/21/24 0637   BP:  (!) 124/92   Pulse:  96   Resp:  16   Temp:  97.9 °F (36.6 °C)   TempSrc:  Oral   SpO2:  98%   Weight: 82.6 kg (182 lb) 82.1 kg (181 lb)   Height: 1.702 m (5' 7\")         Anesthesia Evaluation     Patient summary reviewed and Nursing notes reviewed    History of anesthetic complications   Airway   Mallampati: I  TM distance: >3 FB  Neck ROM: full  Dental - Dentition appears grossly intact     Pulmonary     breath sounds clear to auscultation  (+) asthma  Cardiovascular   Exercise tolerance: good    Rhythm: regular  Rate: normal    Neuro/Psych    (+)  anxiety/panic attacks,        GI/Hepatic/Renal      Endo/Other    (+) hypothyroidism  Abdominal                  Anesthesia Plan:   ASA:  2  Plan:   General  Airway:  ETT and Video laryngoscope  Informed Consent Plan and Risks Discussed With:  Patient, mother and father  Use of Blood Products Discussed With:  Patient  Blood Product Use Consented    Discussed plan with:  Attending      I have informed Tiffanie Graham and/or legal guardian or family member of the nature of the  anesthetic plan, benefits, risks including possible dental damage if relevant, major complications, and any alternative forms of anesthetic management.   All of the patient's questions were answered to the best of my ability. The patient desires the anesthetic management as planned.  Clint Bridges MD  11/21/2024 7:04 AM  Present on Admission:  **None**           [1]   Medications Prior to Admission   Medication Sig Dispense Refill Last Dose/Taking    acetaminophen 500 MG Oral Tab Take 2 tablets (1,000 mg total) by mouth every 6 (six) hours as needed for Pain.   11/20/2024 at  7:00 AM    gabapentin 400 MG Oral Cap 3 (three) times daily.   Past Week    cholecalciferol 1.25 MG (13085 UT) Oral Cap once a week. Tuesdays 11/21/2024 at  5:30 AM    cyclobenzaprine 10 MG Oral Tab Take 1 tablet (10 mg total) by mouth 3 (three) times daily as needed.   11/20/2024 at 10:00 PM    clonazePAM 1 MG Oral Tab Take 1 tablet (1 mg total) by mouth nightly.   11/20/2024 at 10:00 PM    fluticasone propionate 50 MCG/ACT Nasal Suspension 2 sprays 2 (two) times daily.   11/21/2024 at  5:30 AM    amphetamine-dextroamphetamine 5 MG Oral Tab Take 1 tablet by mouth 2 (two) times daily as needed.   11/20/2024 at  4:00 PM    escitalopram 20 MG Oral Tab Take 2 tablets (40 mg total) by mouth at bedtime.   11/20/2024 at 10:00 PM    famotidine 40 MG Oral Tab Take 1 tablet (40 mg total) by mouth nightly.   11/20/2024 at 10:00 PM    Misc Natural Product Nasal (PONARIS) Nasal Solution 1-2 sprays by Nasal route daily as needed.   11/20/2024 at 10:00 PM    nabumetone 750 MG Oral Tab Take 1 tablet (750 mg total) by mouth in the morning and 1 tablet (750 mg total) before bedtime.   Past Month    spironolactone 50 MG Oral Tab Take 1 tablet (50 mg total) by mouth 2 (two) times daily.   11/20/2024 at 10:00 PM    topiramate 50 MG Oral Tab Take 1.5 tablets (75 mg total) by mouth daily. Takes 25 mg in AM and 50 mg at HS   11/19/2024   [2]   Current  Facility-Administered Medications Ordered in Epic   Medication Dose Route Frequency Provider Last Rate Last Admin    lactated ringers infusion   Intravenous Continuous Oswaldo Mcdaniel MD        ceFAZolin (Ancef) 2g in 10mL IV syringe premix  2 g Intravenous Once Oswaldo Mcdaniel MD         No current [facility]-ordered outpatient medications on file.   [3]   Allergies  Allergen Reactions    Symbicort ANAPHYLAXIS    Chlorhexidine RASH

## 2024-11-21 NOTE — ANESTHESIA PROCEDURE NOTES
Airway  Date/Time: 11/21/2024 7:41 AM  Urgency: elective    Airway not difficult    General Information and Staff    Patient location during procedure: OR  Anesthesiologist: Clint Bridges MD  Performed: anesthesiologist   Performed by: Clint Bridges MD  Authorized by: Clint Bridges MD      Indications and Patient Condition  Indications for airway management: anesthesia  Sedation level: deep  Preoxygenated: yes  Patient position: sniffing  MILS maintained throughout  Mask difficulty assessment: 1 - vent by mask    Final Airway Details  Final airway type: endotracheal airway      Successful airway: ETT  Cuffed: yes   Successful intubation technique: Video laryngoscopy  Facilitating devices/methods: intubating stylet  Endotracheal tube insertion site: oral  Blade type: [email or web address] video laryngoscope.  Blade size: #3  ETT size (mm): 7.5    Cormack-Lehane Classification: grade I - full view of glottis  Placement verified by: capnometry   Measured from: lips  ETT to lips (cm): 20  Number of attempts at approach: 1

## 2024-11-21 NOTE — H&P
[facility]  part of [facility]    History & Physical    Tiffanie Graham Patient Status:  Outpatient in a Bed    2000 MRN [id number]   Location [facility] PRE OP RECOVERY Attending Oswaldo Mcdaniel MD   Hosp Day # 0 PCP Zarina Fay     Date:  2024  Date of Admission:  2024    History:  Past Medical History:    Anxiety state    GUDELIA    Asthma (HCC)    Back problem    Unknown etiology    Cancer (HCC)    [place] Neuroblastoma    Disorder of thyroid    Exposure to medical diagnostic radiation    Completed course treatment    Muscle weakness    Due to back problems    Personal history of antineoplastic chemotherapy    Completes course treatment    PONV (postoperative nausea and vomiting)     Past Surgical History:   Procedure Laterality Date    Adenoidectomy      T & A    Brain surgery      Nasal Cavity Tumor Resection (multiple surgeries)    Eye surgery Bilateral 2015    Aguilar Tube Placement (, , and )    Hc percut portal vein catheterization any method      Port-a-Catheter Placement    Hc percut portal vein catheterization any method  2016    Port Removal    Hc surgery catheter eps dx/ablation other than 3d [id number] Right     Right Wrist Cartilage Reconstruction    Other surgical history  1999    Cyst Removal Lower Spine ([place] Meningocele)    Thyroidectomy  2015    Tonsillectomy  2004    T & A    [place] teeth removed  2015    [place] Teeth Extraction x4     Family History   Problem Relation Age of Onset    Heart Disorder Father     Diabetes Father     Other (Parkinsons Disorder) Father     Bipolar Disorder Father     Thyroid Disorder Mother     Obesity Brother       reports that she has never smoked. She does not have any smokeless tobacco history on file. She reports current alcohol use. She reports current drug use.    Allergies:  Allergies[1]    Home Medications:  Prescriptions Prior to Admission[2]    Review of Systems:  12 point ROS  reviewed without pertinent positives.     HPI: The patient presents with complaints of low back pain with radiculopathy. The pain radiates from the low back to the left buttock and down the left posterolateral leg to the calf and top of the left foot. She has intermittent numbness and tingling to the left lateral calf and top of the left foot. She denies prior lumbar spine surgery.  They deny current fevers, chills, infection, rashes/bruises on the body, gross bowel/bladder incontinence or saddle anesthesia.     Physical Exam:  The patient is well developed, no acute distress, alert and oriented x3, skin intact to the posterior lumbar without erythema or edema, motor exam with 3/5 EHL/tib ant on the left otherwise 5/5 bilateral lower extremities, calves soft and non tender, 2+DP      Assessment:  Lumbar stenosis  Lumbar spondylolisthesis  Left lumbar radiculopathy    Plan:  L5-S1 ALIF with PSF      Lona Simon PA-C  11/21/2024  7:02 AM        [1]   Allergies  Allergen Reactions    Symbicort ANAPHYLAXIS    Chlorhexidine RASH   [2]   Medications Prior to Admission   Medication Sig Dispense Refill Last Dose/Taking    acetaminophen 500 MG Oral Tab Take 2 tablets (1,000 mg total) by mouth every 6 (six) hours as needed for Pain.   11/20/2024 at  7:00 AM    gabapentin 400 MG Oral Cap 3 (three) times daily.   Past Week    cholecalciferol 1.25 MG (24725 UT) Oral Cap once a week. Tuesdays 11/21/2024 at  5:30 AM    cyclobenzaprine 10 MG Oral Tab Take 1 tablet (10 mg total) by mouth 3 (three) times daily as needed.   11/20/2024 at 10:00 PM    clonazePAM 1 MG Oral Tab Take 1 tablet (1 mg total) by mouth nightly.   11/20/2024 at 10:00 PM    fluticasone propionate 50 MCG/ACT Nasal Suspension 2 sprays 2 (two) times daily.   11/21/2024 at  5:30 AM    amphetamine-dextroamphetamine 5 MG Oral Tab Take 1 tablet by mouth 2 (two) times daily as needed.   11/20/2024 at  4:00 PM    escitalopram 20 MG Oral Tab Take 2 tablets (40 mg  total) by mouth at bedtime.   11/20/2024 at 10:00 PM    famotidine 40 MG Oral Tab Take 1 tablet (40 mg total) by mouth nightly.   11/20/2024 at 10:00 PM    Misc Natural Product Nasal (PONARIS) Nasal Solution 1-2 sprays by Nasal route daily as needed.   11/20/2024 at 10:00 PM    nabumetone 750 MG Oral Tab Take 1 tablet (750 mg total) by mouth in the morning and 1 tablet (750 mg total) before bedtime.   Past Month    spironolactone 50 MG Oral Tab Take 1 tablet (50 mg total) by mouth 2 (two) times daily.   11/20/2024 at 10:00 PM    topiramate 50 MG Oral Tab Take 1.5 tablets (75 mg total) by mouth daily. Takes 25 mg in AM and 50 mg at HS   11/19/2024

## 2024-11-22 ENCOUNTER — APPOINTMENT (OUTPATIENT)
Dept: GENERAL RADIOLOGY | Facility: HOSPITAL | Age: 24
End: 2024-11-22
Attending: PHYSICIAN ASSISTANT
Payer: COMMERCIAL

## 2024-11-22 LAB
DEPRECATED RDW RBC AUTO: 42 FL (ref 35.1–46.3)
ERYTHROCYTE [DISTWIDTH] IN BLOOD BY AUTOMATED COUNT: 12.8 % (ref 11–15)
HCT VFR BLD AUTO: 36.8 %
HGB BLD-MCNC: 12.4 G/DL
MCH RBC QN AUTO: 30.2 PG (ref 26–34)
MCHC RBC AUTO-ENTMCNC: 33.7 G/DL (ref 31–37)
MCV RBC AUTO: 89.5 FL
PLATELET # BLD AUTO: 202 10(3)UL (ref 150–450)
RBC # BLD AUTO: 4.11 X10(6)UL
WBC # BLD AUTO: 12.9 X10(3) UL (ref 4–11)

## 2024-11-22 PROCEDURE — 99214 OFFICE O/P EST MOD 30 MIN: CPT | Performed by: HOSPITALIST

## 2024-11-22 PROCEDURE — 72100 X-RAY EXAM L-S SPINE 2/3 VWS: CPT | Performed by: PHYSICIAN ASSISTANT

## 2024-11-22 RX ORDER — DEXAMETHASONE SODIUM PHOSPHATE 4 MG/ML
10 VIAL (ML) INJECTION ONCE
Status: COMPLETED | OUTPATIENT
Start: 2024-11-22 | End: 2024-11-22

## 2024-11-22 NOTE — PROGRESS NOTES
[facility]  part of [facility]    Progress Note    Tiffanie Graham Patient Status:  Inpatient    2000 MRN [id number]   Location [facility] 4W/SW/SE Attending Otoniel Thompson MD   Hosp Day # 1 PCP Zarina Fay       Subjective:   Tiffanie Graham is a(n) 24 year old female was seen and examined  Sitting in chair, NAD  Pain controlled  No cp, sob, f,c,n,v abd pain or HA     Objective:   Blood pressure 105/71, pulse 103, temperature 98.7 °F (37.1 °C), temperature source Oral, resp. rate 16, height 5' 7\" (1.702 m), weight 181 lb (82.1 kg), last menstrual period 2024, SpO2 97%.    GENERAL:  Alert and oriented to time, place, and person.   HEENT:  Atraumatic.  Oropharynx clear.  Dry mucous membranes.  Ears and nose normal.  Eyes:  Anicteric sclerae.  NECK:  Supple.  No lymphadenopathy.  Trachea midline.  Full range of motion.  LUNGS:  Clear to auscultation bilaterally.  Normal respiratory effort.    HEART:  Regular rate and rhythm.  S1 and S2 auscultated.  No murmur.  ABDOMEN:  Soft, nondistended.  No tenderness.  Positive bowel sounds.  Lower abdominal dressing and lumbar area dressing.  EXTREMITIES:  No peripheral edema, clubbing, or cyanosis.  NEUROLOGIC:  Motor and sensory intact.  Cranial nerves II through XII are intact.    Current Inpatient Medications:     Current Facility-Administered Medications:     lactated ringers infusion, , Intravenous, Continuous    sodium chloride 0.9 % IV bolus 500 mL, 500 mL, Intravenous, Once PRN    oxyCODONE immediate release tab 5 mg, 5 mg, Oral, Q4H PRN **OR** oxyCODONE immediate release tab 10 mg, 10 mg, Oral, Q4H PRN    HYDROmorphone (Dilaudid) 1 MG/ML injection 0.4 mg, 0.4 mg, Intravenous, Q2H PRN **OR** HYDROmorphone (Dilaudid) 1 MG/ML injection 0.8 mg, 0.8 mg, Intravenous, Q2H PRN    diazePAM (Valium) tab 5 mg, 5 mg, Oral, Q6H PRN    sennosides (Senokot) tab 17.2 mg, 17.2 mg, Oral, Nightly    docusate sodium (Colace) cap 100 mg,  100 mg, Oral, BID    polyethylene glycol (PEG 3350) (Miralax) 17 g oral packet 17 g, 17 g, Oral, Daily PRN    magnesium hydroxide (Milk of Magnesia) 400 MG/5ML oral suspension 30 mL, 30 mL, Oral, Daily PRN    bisacodyl (Dulcolax) 10 MG rectal suppository 10 mg, 10 mg, Rectal, Daily PRN    fleet enema (Fleet) rectal enema 133 mL, 1 enema, Rectal, Once PRN    ondansetron (Zofran) 4 MG/2ML injection 4 mg, 4 mg, Intravenous, Q6H PRN    prochlorperazine (Compazine) 10 MG/2ML injection 5 mg, 5 mg, Intravenous, Q8H PRN    diphenhydrAMINE (Benadryl) cap/tab 25 mg, 25 mg, Oral, Q4H PRN **OR** diphenhydrAMINE (Benadryl) 50 mg/mL  injection 25 mg, 25 mg, Intravenous, Q4H PRN    benzocaine-menthol (Cepacol) lozenge 1 lozenge, 1 lozenge, Buccal, Q15 Min PRN    scopolamine (Transderm-Scop) 1 MG/3DAYS patch 1 patch, 1 patch, Transdermal, Once    clonazePAM (KlonoPIN) tab 1 mg, 1 mg, Oral, Nightly PRN    cyclobenzaprine (Flexeril) tab 10 mg, 10 mg, Oral, TID PRN    escitalopram (Lexapro) tab 40 mg, 40 mg, Oral, Nightly    famotidine (Pepcid) tab 40 mg, 40 mg, Oral, Nightly    fluticasone propionate (Flonase) 50 MCG/ACT nasal suspension 2 spray, 2 spray, Each Nare, BID    spironolactone (Aldactone) tab 50 mg, 50 mg, Oral, BID    topiramate (TopaMAX) tab 25 mg, 25 mg, Oral, QAM    topiramate (TopaMAX) tab 50 mg, 50 mg, Oral, Nightly    Assessment and Plan:   1.       Lumbar spondylolisthesis, status post L5-S1 anterior lumbar interbody fusion with posterior spinal fusion.    Pain control adequate  Cont neuro checks  Cont DVT prophylaxis.    Cont Physical and occupational therapy.    2.       Anxiety disorder.    Continue home medications.     Full code      Results:     Recent Labs   Lab 11/22/24  0438   RBC 4.11   HGB 12.4   HCT 36.8   MCV 89.5   MCH 30.2   MCHC 33.7   RDW 12.8   WBC 12.9*   .0         No results for input(s): \"GLU\", \"BUN\", \"CREATSERUM\", \"GFRAA\", \"GFRNAA\", \"EGFRCR\", \"CA\", \"NA\", \"K\", \"CL\", \"CO2\" in the last  168 hours.      Imaging:   No results found.          Otoniel Thompson MD  11/22/2024

## 2024-11-22 NOTE — OCCUPATIONAL THERAPY NOTE
OCCUPATIONAL THERAPY EVALUATION - INPATIENT     Room Number: 406/406-A  Evaluation Date: 2024  Type of Evaluation: Initial  Presenting Problem: L5-S1 ASF    Physician Order: IP Consult to Occupational Therapy  Reason for Therapy: ADL/IADL Dysfunction and Discharge Planning    OCCUPATIONAL THERAPY ASSESSMENT   Patient is a 24 year old female admitted 2024 for L5-S1 ALIF. Pt w/ orders for spine precautions and binder for support  Prior to admission, patient was living w/ her parents in a split level home. Pt reports being independent for adls and ambulation w/o an ad. Patient is currently functioning below baseline with adls and functional mobility due to spine precautions following surgery.    PLAN  Patient has been evaluated and presents with no skilled Occupational Therapy needs  at this time.  Patient will be discharged from Occupational Therapy services. OT Device Recommendations: Shower chair;Raised toilet seat    OCCUPATIONAL THERAPY MEDICAL/SOCIAL HISTORY   Problem List  Active Problems:    Spinal stenosis of lumbar region with neurogenic claudication    Anxiety    S/P lumbar spinal fusion    HOME SITUATION  Type of Home: House  Home Layout: Multi-level  Lives With: Parent(s)  Toilet and Equipment: Standard height toilet  Shower/Tub and Equipment: Tub-shower combo; Walk-in shower  Other Equipment: None  Patient Regularly Uses: None    Stairs in Home: 3 sets of 6 stairs between floors  Use of Assistive Device(s): none    Prior Level of [place]: Prior to admission, patient was living w/ her parents in a split level home. Pt reports being independent for adls and ambulation w/o an ad.    SUBJECTIVE  \"It feels good to walk\"    OCCUPATIONAL THERAPY EXAMINATION    OBJECTIVE  Precautions: Bed/chair alarm; Spine  Fall Risk: Standard fall risk    WEIGHT BEARING RESTRICTION     PAIN ASSESSMENT  Ratin  Location: back  Management Techniques: Activity promotion; Relaxation; Repositioning;  Ice    ACTIVITY TOLERANCE  Good  /69 eob    O2 SATURATIONS  Activity on room air    SENSATION  Pt denied numbness/tingling    Behavioral/Emotional/Social: cooperative,pt receptive to teaching    RANGE OF MOTION   Upper extremity ROM is within functional limits     STRENGTH ASSESSMENT  Upper extremity strength is within functional limits     ACTIVITIES OF DAILY LIVING ASSESSMENT  AM-PAC ‘6-Clicks’ Inpatient Daily Activity Short Form  How much help from another person does the patient currently need…  -   Putting on and taking off regular lower body clothing?: A Little  -   Bathing (including washing, rinsing, drying)?: A Little  -   Toileting, which includes using toilet, bedpan or urinal? : A Little  -   Putting on and taking off regular upper body clothing?: None  -   Taking care of personal grooming such as brushing teeth?: None  -   Eating meals?: None    AM-PAC Score:  Score: 21  Approx Degree of Impairment: 32.79%  Standardized Score (AM-PAC Scale): 44.27  CMS Modifier (G-Code): CJ    FUNCTIONAL ADL ASSESSMENT  Eating: independent  Grooming: independent  UB Dressing: independent  LB Dressing: supervision  Toileting: independent    Skilled Therapy Provided: OT orders received and chart reviewed. RN approving of pt participation in therapy. Treatment coordinated w/ PT. Pt received in bed, alert and oriented x 4;Mom at bedside. Spine precautions reviewed w/ emphasis on adls and transfers including car and shower. Pt verbalizing understanding of information as discussed. Spine packet at beside. On initial contact pt required min a/vc to log roll supine<>sit at eob. Pt maintained unsupported sitting w/ supervision. Pt performing sit<>stand transfers bed/toilet/chair w/ cga. Pt completed tolieting task w/ mod I. Pt maintained static standing at sink w/ supervision while completing hand hygiene.  Pt ambulating in the simmons w/ rw and cga/close supervision;richard slow and very cautious. Pt demonstrating ability to  manage le clothing w/ figure 4 technique while maintaining spine precautions      Pt currently unable to identify potential areas of concern in anticipation of discharge. Pt presenting w/ sound judgement and good insight into limitations following surgery    At end of session pt remaining up in chair w/ all needs in reach;Mom at bedside. RN aware of pt's status and performance in therapy. No further OT contact planned       EDUCATION PROVIDED  Patient Education : Role of Occupational Therapy; Plan of Care; Functional Transfer Techniques; Fall Prevention; Surgical Precautions; Posture/Positioning (compensatory strategies)  Patient's Response to Education: Verbalized Understanding; Returned Demonstration  Family/Caregiver's Response to Education: Verbalized Understanding    The patient's Approx Degree of Impairment: 32.79% has been calculated based on documentation in the [facility] '6 clicks' Inpatient Daily Activity Short Form.  Research supports that patients with this level of impairment may benefit from return to home.  Final disposition will be made by interdisciplinary medical team.    Patient End of Session: Up in chair;Needs met;Call light within reach;RN aware of session/findings;All patient questions and concerns addressed;Family present    Patient Evaluation Complexity Level:   Occupational Profile/Medical History LOW - Brief history including review of medical or therapy records    Specific performance deficits impacting engagement in ADL/IADL LOW  1 - 3 performance deficits    Client Assessment/Performance Deficits MODERATE - Comorbidities and min to mod modifications of tasks    Clinical Decision Making MODERATE - Analysis of occupational profile, detailed assessments, several treatment options    Overall Complexity MODERATE     Therapeutic Activity: 20 minutes

## 2024-11-22 NOTE — PROGRESS NOTES
[facility]  part of [facility]    Progress Note    Tiffanie Graham Patient Status:  Inpatient    2000 MRN [id number]   Location [facility] 4W/SW/SE Attending Oswaldo Mcdaniel MD   Hosp Day # 1 PCP Zarina Fay     SUBJECTIVE:  Interval History: Patient has moderate pain at rest, more severe with getting out of bed.  Pain is in low back and lower abdominal area around incision.  She denies any fever, chills, gross bowel/bladder incontinence or saddle anesthesia. They also deny calf pain, cramping, chest pain, difficulty breathing or shortness of breath.     Post-Op Day: 1    OBJECTIVE:  Blood pressure 98/55, pulse 94, temperature 98.5 °F (36.9 °C), temperature source Oral, resp. rate 16, height 5' 7\" (1.702 m), weight 181 lb (82.1 kg), last menstrual period 2024, SpO2 100%.     Ortho Spine Exam:  Incisional dressings are clean, dry and intact.  Adjacent skin is without erythema or edema.  Motor exam is 4/5 left EHL and tib ant, otherwise 5/5 to bilateral LE.  2+ dorsalis pedis and posterior tibialis pulses.  Sensation is intact distally. Calves are soft and non-tender to palpation.      ASSESSMENT/PLAN:    S/P L5-S1 ALIF with PSF     1) Continue current pain management protocol.  Will order Decadron for increased pain, discussed with patient her allergy to Symbicort and she reports she has had IV and oral steroids in the past without any reaction.    2) Plan to discharge home pending clearance from the medicine/hospitalist team and PT/OT and pain control.  3) Post operative medications were sent to patient's pharmacy outside of [facility]   4) Plan to have patient remove their dressing on POD#3  5) Follow up in clinic in 2 to 3 weeks, appointment should already be scheduled, please have patient call [phone] to confirm or change date/location.   6) All questions answered by the bedside today     RAYA HERMAN NP  [phone]  2024  7:34 AM

## 2024-11-22 NOTE — PHYSICAL THERAPY NOTE
PHYSICAL THERAPY EVALUATION - INPATIENT     Room Number: 406/406-A  Evaluation Date: 11/22/2024  Type of Evaluation: Initial   Physician Order: PT Eval and Treat    Presenting Problem: s/p L5-S1 ALIF  Co-Morbidities : Anxiety state     GUDELIA   Asthma (HCC)   Back problem    Unknown etiology   Cancer (HCC)    Esthesio Neuroblastoma   Disorder of thyroid   Exposure to medical diagnostic radiation    Completed course treatment   Muscle weakness    Due to back problems   Personal history of antineoplastic chemotherapy    Completes course treatment   PONV (postoperative nausea and vomiting)  Reason for Therapy: Mobility Dysfunction and Discharge Planning    PHYSICAL THERAPY ASSESSMENT   Patient is a 24 year old female admitted 11/21/2024 for s/p L5-S1 ALIF.  Prior to admission, patient's baseline is independent.  Patient is currently functioning near baseline with bed mobility, transfers, gait, stair negotiation, maintaining seated position, standing prolonged periods, and performing household tasks.  Patient is requiring contact guard assist as a result of the following impairments: decreased functional strength, decreased endurance/aerobic capacity, pain, impaired standing balance, decreased muscular endurance, and medical status.  Physical Therapy will continue to follow for duration of hospitalization.    Patient will benefit from continued skilled PT Services at discharge to promote prior level of function and safety with additional support and return home with home PT.    PLAN DURING HOSPITALIZATION  Nursing Mobility Recommendation : 1 Assist     PT Treatment Plan: Bed mobility;Body mechanics;Coordination;Endurance;Energy conservation;Gait training;Strengthening;Transfer training;Balance training  Rehab Potential : Fair  Frequency (Obs): 5x/week     PHYSICAL THERAPY MEDICAL/SOCIAL HISTORY   History related to current admission: The patient presents with complaints of low back pain with radiculopathy. The pain  radiates from the low back to the left buttock and down the left posterolateral leg to the calf and top of the left foot. She has intermittent numbness and tingling to the left lateral calf and top of the left foot. She denies prior lumbar spine surgery.  They deny current fevers, chills, infection, rashes/bruises on the body, gross bowel/bladder incontinence or saddle anesthesia.      Problem List  Active Problems:    Spinal stenosis of lumbar region with neurogenic claudication    Anxiety    S/P lumbar spinal fusion    HOME SITUATION  Type of Home: House  Home Layout: Multi-level  Stairs to Enter : 6   Railing: Yes    Stairs to Bedroom: 6    Railing: Yes    Lives With: Parent(s)    Patient Regularly Uses:  (has crutches available for use)     Prior Level of [place]: Prior to admission patient was independent with performance of all ADL's and performance of functional mobility with no assistive device.    SUBJECTIVE  \"It feels better to sit in the chair\"    PHYSICAL THERAPY EXAMINATION   OBJECTIVE  Precautions: Bed/chair alarm;Spine  Fall Risk: Standard fall risk    PAIN ASSESSMENT  Rating: Unable to rate  Location: spine  Management Techniques: Activity promotion;Body mechanics;Relaxation;Repositioning;Breathing techniques    COGNITION  Overall Cognitive Status:  WFL - within functional limits  Arousal/Alertness:  appropriate responses to stimuli  Orientation Level:  oriented x4  Safety Judgement:  good awareness of safety precautions    RANGE OF MOTION AND STRENGTH ASSESSMENT  Upper extremity ROM and strength are within functional limits BUE.  Lower extremity ROM is within functional limits BLE.  Lower extremity strength is within functional limits BLE.    BALANCE  Static Sitting: Good  Dynamic Sitting: Fair  Static Standing: Fair  Dynamic Standing: Poor    ACTIVITY TOLERANCE  BP: 107/69  BP Location: Right arm  BP Method: Automatic  Patient Position: Sitting    AM-PAC '6-Clicks' INPATIENT SHORT FORM - BASIC  MOBILITY  How much difficulty does the patient currently have...  Patient Difficulty: Turning over in bed (including adjusting bedclothes, sheets and blankets)?: A Little   Patient Difficulty: Sitting down on and standing up from a chair with arms (e.g., wheelchair, bedside commode, etc.): A Little   Patient Difficulty: Moving from lying on back to sitting on the side of the bed?: A Little   How much help from another person does the patient currently need...   Help from Another: Moving to and from a bed to a chair (including a wheelchair)?: A Little   Help from Another: Need to walk in hospital room?: A Little   Help from Another: Climbing 3-5 steps with a railing?: A Little     AM-PAC Score:  Raw Score: 18   Approx Degree of Impairment: 46.58%   Standardized Score (AM-PAC Scale): 43.63   CMS Modifier (G-Code): CK    FUNCTIONAL ABILITY STATUS  Functional Mobility/Gait Assessment  Gait Assistance: Contact guard assist (progressing to SBA)  Distance (ft): 15 feet x 1, 120 feet x 1  Assistive Device: Rolling walker  Pattern:  (decreased richard, decreased step length, forward flexed posture, narrow base of support, verbal cues for sequencing and rolling walker management.)  Rolling: contact guard assist  Supine to Sit: minimal assist  Sit to Supine:  not tested  Sit to Stand: contact guard assist    Exercise/Education Provided:  Education Provided To: Patient;Family/Caregiver Patient Education: Role of Physical Therapy;Plan of Care;Discharge Recommendations;DME Recommendations;Functional Transfer Techniques;Fall Prevention;Weight Bear Status;Surgical Precautions;Posture/Positioning;Energy Conservation;Gait Training Patient's Response to Education: Verbalized Understanding     Skilled Therapy Provided: Patient received supine in bed at initiation of session agreeable to participation in PT. Patient tolerates treatment well, able to perform bed mobility with minimal assistance and transfers/ambulation with CGA to SBA.  Reviewed spinal precautions during session, patient verbalizes understanding. Patient left in bedside chair, lines intact, needs in reach and handoff to RN.    The patient's Approx Degree of Impairment: 46.58% has been calculated based on documentation in the [facility] '6 clicks' Inpatient Basic Mobility Short Form.  Research supports that patients with this level of impairment may benefit from home.  Final disposition will be made by interdisciplinary medical team.    Patient End of Session: Up in chair;Needs met;Call light within reach;All patient questions and concerns addressed;Hospital anti-slip socks;RN aware of session/findings;Alarm set    CURRENT GOALS  Goals to be met by: 12/6/24  Patient Goal Patient's self-stated goal is: to return home   Goal #1 Patient is able to demonstrate supine - sit EOB @ level: modified independent     Goal #1   Current Status    Goal #2 Patient is able to demonstrate transfers Sit to/from Stand at assistance level: modified independent with  least restrictive device     Goal #2  Current Status    Goal #3 Patient is able to ambulate >150 feet with assist device:  least restrictive device  at assistance level: modified independent   Goal #3   Current Status    Goal #4 Patient will negotiate 6 stairs/one curb w/ assistive device and supervision   Goal #4   Current Status    Goal #5 Patient to demonstrate independence with home activity/exercise instructions provided to patient in preparation for discharge.   Goal #5   Current Status    Goal #6    Goal #6  Current Status      Patient Evaluation Complexity Level:  History High - 3 or more personal factors and/or co-morbidities   Examination of body systems Moderate - addressing a total of 3 or more elements   Clinical Presentation  Moderate - Evolving   Clinical Decision Making  Moderate Complexity     Gait Training: 10 minutes    Leslee Woods PT, DPT

## 2024-11-22 NOTE — PLAN OF CARE
PRN pain medications given. Abd binder on. Frequent rounding by staff. Call light within reach. Safety precautions in place.   Problem: Patient Centered Care  Goal: Patient preferences are identified and integrated in the patient's plan of care  Description: Interventions:  - What would you like us to know as we care for you? From home w parents  - Provide timely, complete, and accurate information to patient/family  - Incorporate patient and family knowledge, values, beliefs, and cultural backgrounds into the planning and delivery of care  - Encourage patient/family to participate in care and decision-making at the level they choose  - Honor patient and family perspectives and choices  Outcome: Progressing     Problem: Patient/Family Goals  Goal: Patient/Family Long Term Goal  Description: Patient's Long Term Goal: Discharge    Interventions:  - PT  - See additional Care Plan goals for specific interventions  Outcome: Progressing  Goal: Patient/Family Short Term Goal  Description: Patient's Short Term Goal: Pain management    Interventions:   - Medication administration   - See additional Care Plan goals for specific interventions  Outcome: Progressing     Problem: SKIN/TISSUE INTEGRITY - ADULT  Goal: Skin integrity remains intact  Description: INTERVENTIONS  - Assess and document risk factors for pressure ulcer development  - Assess and document skin integrity  - Monitor for areas of redness and/or skin breakdown  - Initiate interventions, skin care algorithm/standards of care as needed  Outcome: Progressing  Goal: Incision(s), wounds(s) or drain site(s) healing without S/S of infection  Description: INTERVENTIONS:  - Assess and document risk factors for pressure ulcer development  - Assess and document skin integrity  - Assess and document dressing/incision, wound bed, drain sites and surrounding tissue  - Implement wound care per orders  - Initiate isolation precautions as appropriate  - Initiate Pressure Ulcer  prevention bundle as indicated  Outcome: Progressing     Problem: MUSCULOSKELETAL - ADULT  Goal: Return mobility to safest level of function  Description: INTERVENTIONS:  - Assess patient stability and activity tolerance for standing, transferring and ambulating w/ or w/o assistive devices  - Assist with transfers and ambulation using safe patient handling equipment as needed  - Ensure adequate protection for wounds/incisions during mobilization  - Obtain PT/OT consults as needed  - Advance activity as appropriate  - Communicate ordered activity level and limitations with patient/family  Outcome: Progressing  Goal: Maintain proper alignment of affected body part  Description: INTERVENTIONS:  - Support and protect limb and body alignment per provider's orders  - Instruct and reinforce with patient and family use of appropriate assistive device and precautions (e.g. spinal or hip dislocation precautions)  Outcome: Progressing     Problem: Impaired Functional Mobility  Goal: Achieve highest/safest level of mobility/gait  Description: Interventions:  - Assess patient's functional ability and stability  - Promote increasing activity/tolerance for mobility and gait  - Educate and engage patient/family in tolerated activity level and precautions  Outcome: Progressing     Problem: PAIN - ADULT  Goal: Verbalizes/displays adequate comfort level or patient's stated pain goal  Description: INTERVENTIONS:  - Encourage pt to monitor pain and request assistance  - Assess pain using appropriate pain scale  - Administer analgesics based on type and severity of pain and evaluate response  - Implement non-pharmacological measures as appropriate and evaluate response  - Consider cultural and social influences on pain and pain management  - Manage/alleviate anxiety  - Utilize distraction and/or relaxation techniques  - Monitor for opioid side effects  - Notify MD/LIP if interventions unsuccessful or patient reports new pain  -  Anticipate increased pain with activity and pre-medicate as appropriate  Outcome: Progressing     Problem: SAFETY ADULT - FALL  Goal: Free from fall injury  Description: INTERVENTIONS:  - Assess pt frequently for physical needs  - Identify cognitive and physical deficits and behaviors that affect risk of falls.  - [place] fall precautions as indicated by assessment.  - Educate pt/family on patient safety including physical limitations  - Instruct pt to call for assistance with activity based on assessment  - Modify environment to reduce risk of injury  - Provide assistive devices as appropriate  - Consider OT/PT consult to assist with strengthening/mobility  - Encourage toileting schedule  Outcome: Progressing     Problem: DISCHARGE PLANNING  Goal: Discharge to home or other facility with appropriate resources  Description: INTERVENTIONS:  - Identify barriers to discharge w/pt and caregiver  - Include patient/family/discharge partner in discharge planning  - Arrange for needed discharge resources and transportation as appropriate  - Identify discharge learning needs (meds, wound care, etc)  - Arrange for interpreters to assist at discharge as needed  - Consider post-discharge preferences of patient/family/discharge partner  - Complete POLST form as appropriate  - Assess patient's ability to be responsible for managing their own health  - Refer to Case Management Department for coordinating discharge planning if the patient needs post-hospital services based on physician/LIP order or complex needs related to functional status, cognitive ability or social support system  Outcome: Progressing

## 2024-11-22 NOTE — PLAN OF CARE
Patient is A&Ox4. VSS. RA. POD#1-l5-s1 Anterior lumbar interbody fusion w/ posterior spinal fusion with Dr. Mcdaniel. Dermabond, telfa, and tegaderm dressings over incision site. Teds and SCDs for VTE prophylaxis. Patient is up x1 and walker. She is incontinent and passing gas. PRN oxycodone for pain administered. Patient is tolerating general diet. PT worked with patient today and walked with her into hallway. Plan is for discharge home with parents tomorrow.   Problem: Patient Centered Care  Goal: Patient preferences are identified and integrated in the patient's plan of care  Description: Interventions:  - What would you like us to know as we care for you?   - Provide timely, complete, and accurate information to patient/family  - Incorporate patient and family knowledge, values, beliefs, and cultural backgrounds into the planning and delivery of care  - Encourage patient/family to participate in care and decision-making at the level they choose  - Honor patient and family perspectives and choices  Outcome: Progressing     Problem: Patient/Family Goals  Goal: Patient/Family Long Term Goal    Interventions:  - See additional Care Plan goals for specific interventions  Outcome: Progressing  Goal: Patient/Family Short Term Goal    Interventions:   - See additional Care Plan goals for specific interventions  Outcome: Progressing     Problem: SKIN/TISSUE INTEGRITY - ADULT  Goal: Skin integrity remains intact  Description: INTERVENTIONS  - Assess and document risk factors for pressure ulcer development  - Assess and document skin integrity  - Monitor for areas of redness and/or skin breakdown  - Initiate interventions, skin care algorithm/standards of care as needed  Outcome: Progressing  Goal: Incision(s), wounds(s) or drain site(s) healing without S/S of infection  Description: INTERVENTIONS:  - Assess and document risk factors for pressure ulcer development  - Assess and document skin integrity  - Assess and document  dressing/incision, wound bed, drain sites and surrounding tissue  - Implement wound care per orders  - Initiate isolation precautions as appropriate  - Initiate Pressure Ulcer prevention bundle as indicated  Outcome: Progressing     Problem: MUSCULOSKELETAL - ADULT  Goal: Return mobility to safest level of function  Description: INTERVENTIONS:  - Assess patient stability and activity tolerance for standing, transferring and ambulating w/ or w/o assistive devices  - Assist with transfers and ambulation using safe patient handling equipment as needed  - Ensure adequate protection for wounds/incisions during mobilization  - Obtain PT/OT consults as needed  - Advance activity as appropriate  - Communicate ordered activity level and limitations with patient/family  Outcome: Progressing  Goal: Maintain proper alignment of affected body part  Description: INTERVENTIONS:  - Support and protect limb and body alignment per provider's orders  - Instruct and reinforce with patient and family use of appropriate assistive device and precautions (e.g. spinal or hip dislocation precautions)  Outcome: Progressing     Problem: Impaired Functional Mobility  Goal: Achieve highest/safest level of mobility/gait  Description: Interventions:  - Assess patient's functional ability and stability  - Promote increasing activity/tolerance for mobility and gait  - Educate and engage patient/family in tolerated activity level and precautions  Outcome: Progressing     Problem: PAIN - ADULT  Goal: Verbalizes/displays adequate comfort level or patient's stated pain goal  Description: INTERVENTIONS:  - Encourage pt to monitor pain and request assistance  - Assess pain using appropriate pain scale  - Administer analgesics based on type and severity of pain and evaluate response  - Implement non-pharmacological measures as appropriate and evaluate response  - Consider cultural and social influences on pain and pain management  - Manage/alleviate  anxiety  - Utilize distraction and/or relaxation techniques  - Monitor for opioid side effects  - Notify MD/LIP if interventions unsuccessful or patient reports new pain  - Anticipate increased pain with activity and pre-medicate as appropriate  Outcome: Progressing     Problem: SAFETY ADULT - FALL  Goal: Free from fall injury  Description: INTERVENTIONS:  - Assess pt frequently for physical needs  - Identify cognitive and physical deficits and behaviors that affect risk of falls.  - [place] fall precautions as indicated by assessment.  - Educate pt/family on patient safety including physical limitations  - Instruct pt to call for assistance with activity based on assessment  - Modify environment to reduce risk of injury  - Provide assistive devices as appropriate  - Consider OT/PT consult to assist with strengthening/mobility  - Encourage toileting schedule  Outcome: Progressing     Problem: DISCHARGE PLANNING  Goal: Discharge to home or other facility with appropriate resources  Description: INTERVENTIONS:  - Identify barriers to discharge w/pt and caregiver  - Include patient/family/discharge partner in discharge planning  - Arrange for needed discharge resources and transportation as appropriate  - Identify discharge learning needs (meds, wound care, etc)  - Arrange for interpreters to assist at discharge as needed  - Consider post-discharge preferences of patient/family/discharge partner  - Complete POLST form as appropriate  - Assess patient's ability to be responsible for managing their own health  - Refer to Case Management Department for coordinating discharge planning if the patient needs post-hospital services based on physician/LIP order or complex needs related to functional status, cognitive ability or social support system  Outcome: Progressing

## 2024-11-23 PROCEDURE — 99214 OFFICE O/P EST MOD 30 MIN: CPT | Performed by: HOSPITALIST

## 2024-11-23 RX ORDER — ACETAMINOPHEN 500 MG
500 TABLET ORAL EVERY 6 HOURS PRN
Status: DISCONTINUED | OUTPATIENT
Start: 2024-11-23 | End: 2024-11-24

## 2024-11-23 RX ORDER — DEXAMETHASONE SODIUM PHOSPHATE 4 MG/ML
10 VIAL (ML) INJECTION EVERY 8 HOURS
Status: COMPLETED | OUTPATIENT
Start: 2024-11-23 | End: 2024-11-23

## 2024-11-23 NOTE — PROGRESS NOTES
[facility]  [facility] of [facility]    Progress Note    Tiffanie Graham Patient Status:  Inpatient    2000 MRN [id number]   Location [facility] 4W/SW/SE Attending Oswaldo Mcdaniel MD   Hosp Day # 0 PCP Zarina Fay     SUBJECTIVE:  Interval History: Patient improved    She denies any fever, chills, gross bowel/bladder incontinence or saddle anesthesia. They also deny calf pain, cramping, chest pain, difficulty breathing or shortness of breath.     Post-Op Day: 1    OBJECTIVE:  Blood pressure 109/66, pulse 82, temperature 97.6 °F (36.4 °C), temperature source Temporal, resp. rate 18, height 5' 7\" (1.702 m), weight 181 lb (82.1 kg), last menstrual period 2024, SpO2 91%.     Ortho Spine Exam:  Incisional dressings are clean, dry and intact.  Adjacent skin is without erythema or edema.  Motor exam is 4/5 left EHL and tib ant, otherwise 5/5 to bilateral LE.  2+ dorsalis pedis and posterior tibialis pulses.  Sensation is intact distally. Calves are soft and non-tender to palpation.      ASSESSMENT/PLAN:    S/P L5-S1 ALIF with PSF     Feeling good home today    1) Continue current pain management protocol.   2) Plan to discharge home today  3) Post operative medications were sent to patient's pharmacy outside of [facility]   4) Plan to have patient remove their dressing on POD#3  5) Follow up in clinic in 2 to 3 weeks, appointment should already be scheduled, please have patient call [phone] to confirm or change date/location.   6) All questions answered by the bedside today     Herman Arauz MD

## 2024-11-23 NOTE — PHYSICAL THERAPY NOTE
PHYSICAL THERAPY TREATMENT NOTE - INPATIENT     Room Number: 406/406-A       Presenting Problem: s/p L5-S1 ALIF  Co-Morbidities : Anxiety state     GUDELIA   Asthma (HCC)   Back problem    Unknown etiology   Cancer (HCC)    Esthesio Neuroblastoma   Disorder of thyroid   Exposure to medical diagnostic radiation    Completed course treatment   Muscle weakness    Due to back problems   Personal history of antineoplastic chemotherapy    Completes course treatment   PONV (postoperative nausea and vomiting)    Problem List  Active Problems:    Spinal stenosis of lumbar region with neurogenic claudication    Anxiety    S/P lumbar spinal fusion      PHYSICAL THERAPY ASSESSMENT   Patient demonstrates excellent progress this session, goals  remain in progress.      Patient is requiring supervision and contact guard assist as a result of the following impairments: decreased functional strength, decreased endurance/aerobic capacity, pain, and medical status.     Patient continues to function below baseline with bed mobility, transfers, and gait.  Next session anticipate patient to progress gait and stair negotiation.  Physical Therapy will continue to follow patient for duration of hospitalization.    Patient continues to benefit from continued skilled PT services: with no additional skilled services but increased support at home.  After 6-8 weeks, patient will be ready to resume OP PT    PLAN DURING HOSPITALIZATION  Nursing Mobility Recommendation : 1 Assist     PT Treatment Plan: Gait training;Patient education;Stair training  Frequency (Obs): Daily     SUBJECTIVE  Feeling better    OBJECTIVE  Precautions: Spine    WEIGHT BEARING RESTRICTION       PAIN ASSESSMENT   Ratin  Location: abdom, back  Management Techniques: Activity promotion    BALANCE  Static Sitting: Good  Dynamic Sitting: Good  Static Standing: Fair  Dynamic Standing: Fair -    ACTIVITY TOLERANCE                          O2 WALK       AM-PAC '6-Clicks' INPATIENT  SHORT FORM - BASIC MOBILITY  How much difficulty does the patient currently have...  Patient Difficulty: Turning over in bed (including adjusting bedclothes, sheets and blankets)?: A Little   Patient Difficulty: Sitting down on and standing up from a chair with arms (e.g., wheelchair, bedside commode, etc.): A Little   Patient Difficulty: Moving from lying on back to sitting on the side of the bed?: A Little   How much help from another person does the patient currently need...   Help from Another: Moving to and from a bed to a chair (including a wheelchair)?: A Little   Help from Another: Need to walk in hospital room?: A Little   Help from Another: Climbing 3-5 steps with a railing?: A Little     AM-PAC Score:  Raw Score: 18   Approx Degree of Impairment: 46.58%   Standardized Score (AM-PAC Scale): 43.63   CMS Modifier (G-Code): CK    FUNCTIONAL ABILITY STATUS  Functional Mobility/Gait Assessment  Gait Assistance: Supervision  Distance (ft): 2x150  Assistive Device: Rolling walker  Pattern: Shuffle  Stairs: Stairs  How Many Stairs: 6  Device: 1 Rail  Assist: Contact guard assist  Rolling: contact guard assist  Supine to Sit: contact guard assist  Sit to Supine: contact guard assist  Sit to Stand: contact guard assist    Skilled Therapy Provided: Chart reviewed. RN aware. Patient up in bed.  3/3 with BLT recall.  Log rolled with CGA.  Sit to stand with CGA and RW.  Adjusted RW for home use.  Walked 2x150 ft with RW and sup.  Performed 6 steps with one rail CGA and step to pattern.  Finished up in chair.  Reviewed home safety, need for freq position changes, appropriate chairs, benefits of walking, healing times for Sx. All needs left within reach. RN aware.    The patient's Approx Degree of Impairment: 46.58% has been calculated based on documentation in the [facility] '6 clicks' Inpatient Daily Activity Short Form.  Research supports that patients with this level of impairment may benefit from DC to home.  Final  disposition will be made by interdisciplinary medical team.    THERAPEUTIC EXERCISES  Lower Extremity Ankle pumps     Position Sitting       Patient End of Session: Up in chair;Needs met;Call light within reach;RN aware of session/findings;All patient questions and concerns addressed;Hospital anti-slip socks;Family present    CURRENT GOALS  Goals to be met by: 24  Patient Goal Patient's self-stated goal is: to return home   Goal #1 Patient is able to demonstrate supine - sit EOB @ level: modified independent     Goal #1   Current Status CGA   Goal #2 Patient is able to demonstrate transfers Sit to/from Stand at assistance level: modified independent with least restrictive device     Goal #2  Current Status CGA w/ RW   Goal #3 Patient is able to ambulate >150 feet with assist device: least restrictive device at assistance level: modified independent   Goal #3   Current Status 2x150 ft with RW sup   Goal #4 Patient will negotiate 6 stairs/one curb w/ assistive device and supervision   Goal #4   Current Status 6 step one rail CGA   Goal #5 Patient to demonstrate independence with home activity/exercise instructions provided to patient in preparation for discharge.   Goal #5   Current Status ongoing   Goal #6    Goal #6  Current Status            Gait Trainin minutes  Therapeutic Activity: 25 minutes  Neuromuscular Re-education: 15 minutes  Therapeutic Exercise: 0 minutes  Canalith Repositionin minutes  Manual Therapy: 0 minutes  Can add/delete any of these

## 2024-11-23 NOTE — PROGRESS NOTES
[facility]  part of [facility]    Progress Note    Tiffanie Graham Patient Status:  Inpatient    2000 MRN [id number]   Location [facility] 4W/SW/SE Attending Otoniel Thompson MD   Hosp Day # 0 PCP Zarina Fay       Subjective:   Tiffanie Graham is a(n) 24 year old female was seen and examined  Pt sitting comfortably, NAD  No acute events overnight   Pain controlled  No cp, sob, f,c,n,v abd pain or HA     Objective:   Blood pressure 94/60, pulse 74, temperature 97.6 °F (36.4 °C), temperature source Temporal, resp. rate 18, height 5' 7\" (1.702 m), weight 181 lb (82.1 kg), last menstrual period 2024, SpO2 100%.    GENERAL:  Alert and oriented to time, place, and person.   HEENT:  Atraumatic.  Oropharynx clear.  Dry mucous membranes.  Ears and nose normal.  Eyes:  Anicteric sclerae.  NECK:  Supple.  No lymphadenopathy.  Trachea midline.  Full range of motion.  LUNGS:  Clear to auscultation bilaterally.  Normal respiratory effort.    HEART:  Regular rate and rhythm.  S1 and S2 auscultated.  No murmur.  ABDOMEN:  Soft, nondistended.  No tenderness.  Positive bowel sounds.  Lower abdominal dressing and lumbar area dressing.  EXTREMITIES:  No peripheral edema, clubbing, or cyanosis.  NEUROLOGIC:  Motor and sensory intact.  Cranial nerves II through XII are intact.    Current Inpatient Medications:     Current Facility-Administered Medications:     lactated ringers infusion, , Intravenous, Continuous    oxyCODONE immediate release tab 5 mg, 5 mg, Oral, Q4H PRN **OR** oxyCODONE immediate release tab 10 mg, 10 mg, Oral, Q4H PRN    HYDROmorphone (Dilaudid) 1 MG/ML injection 0.4 mg, 0.4 mg, Intravenous, Q2H PRN **OR** HYDROmorphone (Dilaudid) 1 MG/ML injection 0.8 mg, 0.8 mg, Intravenous, Q2H PRN    diazePAM (Valium) tab 5 mg, 5 mg, Oral, Q6H PRN    sennosides (Senokot) tab 17.2 mg, 17.2 mg, Oral, Nightly    docusate sodium (Colace) cap 100 mg, 100 mg, Oral, BID    polyethylene  glycol (PEG 3350) (Miralax) 17 g oral packet 17 g, 17 g, Oral, Daily PRN    magnesium hydroxide (Milk of Magnesia) 400 MG/5ML oral suspension 30 mL, 30 mL, Oral, Daily PRN    bisacodyl (Dulcolax) 10 MG rectal suppository 10 mg, 10 mg, Rectal, Daily PRN    fleet enema (Fleet) rectal enema 133 mL, 1 enema, Rectal, Once PRN    ondansetron (Zofran) 4 MG/2ML injection 4 mg, 4 mg, Intravenous, Q6H PRN    prochlorperazine (Compazine) 10 MG/2ML injection 5 mg, 5 mg, Intravenous, Q8H PRN    diphenhydrAMINE (Benadryl) cap/tab 25 mg, 25 mg, Oral, Q4H PRN **OR** diphenhydrAMINE (Benadryl) 50 mg/mL  injection 25 mg, 25 mg, Intravenous, Q4H PRN    benzocaine-menthol (Cepacol) lozenge 1 lozenge, 1 lozenge, Buccal, Q15 Min PRN    scopolamine (Transderm-Scop) 1 MG/3DAYS patch 1 patch, 1 patch, Transdermal, Once    clonazePAM (KlonoPIN) tab 1 mg, 1 mg, Oral, Nightly PRN    cyclobenzaprine (Flexeril) tab 10 mg, 10 mg, Oral, TID PRN    escitalopram (Lexapro) tab 40 mg, 40 mg, Oral, Nightly    famotidine (Pepcid) tab 40 mg, 40 mg, Oral, Nightly    fluticasone propionate (Flonase) 50 MCG/ACT nasal suspension 2 spray, 2 spray, Each Nare, BID    spironolactone (Aldactone) tab 50 mg, 50 mg, Oral, BID    topiramate (TopaMAX) tab 25 mg, 25 mg, Oral, QAM    topiramate (TopaMAX) tab 50 mg, 50 mg, Oral, Nightly    Assessment and Plan:   1.       Lumbar spondylolisthesis, status post L5-S1 anterior lumbar interbody fusion with posterior spinal fusion.    Pain control adequate  Cont neuro checks  Cont DVT prophylaxis.    Cont Physical and occupational therapy  Stable for dc    2.       Anxiety disorder.    Continue home medications.     Full code      Results:     Recent Labs   Lab 11/22/24  0438   RBC 4.11   HGB 12.4   HCT 36.8   MCV 89.5   MCH 30.2   MCHC 33.7   RDW 12.8   WBC 12.9*   .0         No results for input(s): \"GLU\", \"BUN\", \"CREATSERUM\", \"GFRAA\", \"GFRNAA\", \"EGFRCR\", \"CA\", \"NA\", \"K\", \"CL\", \"CO2\" in the last 168  hours.      Imaging:   XR LUMBAR SPINE (MIN 2 VIEWS) (CPT=72100)    Result Date: 11/22/2024  CONCLUSION: L5-S1 the posterior fusion with no radiographic evidence of hardware failure.     Dictated by (CST): Miah Naqvi MD on 11/22/2024 at 5:34 PM     Finalized by (CST): Miah Naqvi MD on 11/22/2024 at 5:35 PM                 Otoniel Thompson MD  11/23/2024

## 2024-11-23 NOTE — PLAN OF CARE
Post-op day 2. Alert and oriented x4 on room air. Abdominal binder for comfort. Gel ice and PRN oxy for pain. Up standby with walker. Passing gas. Voiding freely. Call light within reach. Plan for home today.    Problem: Patient Centered Care  Goal: Patient preferences are identified and integrated in the patient's plan of care  Description: Interventions:  - What would you like us to know as we care for you?   - Provide timely, complete, and accurate information to patient/family  - Incorporate patient and family knowledge, values, beliefs, and cultural backgrounds into the planning and delivery of care  - Encourage patient/family to participate in care and decision-making at the level they choose  - Honor patient and family perspectives and choices  Outcome: Progressing     Problem: Patient/Family Goals  Goal: Patient/Family Long Term Goal  Description: Patient's Long Term Goal:     Interventions:  -   - See additional Care Plan goals for specific interventions  Outcome: Progressing  Goal: Patient/Family Short Term Goal  Description: Patient's Short Term Goal:     Interventions:   -   - See additional Care Plan goals for specific interventions  Outcome: Progressing     Problem: SKIN/TISSUE INTEGRITY - ADULT  Goal: Skin integrity remains intact  Description: INTERVENTIONS  - Assess and document risk factors for pressure ulcer development  - Assess and document skin integrity  - Monitor for areas of redness and/or skin breakdown  - Initiate interventions, skin care algorithm/standards of care as needed  Outcome: Progressing  Goal: Incision(s), wounds(s) or drain site(s) healing without S/S of infection  Description: INTERVENTIONS:  - Assess and document risk factors for pressure ulcer development  - Assess and document skin integrity  - Assess and document dressing/incision, wound bed, drain sites and surrounding tissue  - Implement wound care per orders  - Initiate isolation precautions as appropriate  - Initiate  Pressure Ulcer prevention bundle as indicated  Outcome: Progressing     Problem: MUSCULOSKELETAL - ADULT  Goal: Return mobility to safest level of function  Description: INTERVENTIONS:  - Assess patient stability and activity tolerance for standing, transferring and ambulating w/ or w/o assistive devices  - Assist with transfers and ambulation using safe patient handling equipment as needed  - Ensure adequate protection for wounds/incisions during mobilization  - Obtain PT/OT consults as needed  - Advance activity as appropriate  - Communicate ordered activity level and limitations with patient/family  Outcome: Progressing  Goal: Maintain proper alignment of affected body part  Description: INTERVENTIONS:  - Support and protect limb and body alignment per provider's orders  - Instruct and reinforce with patient and family use of appropriate assistive device and precautions (e.g. spinal or hip dislocation precautions)  Outcome: Progressing     Problem: Impaired Functional Mobility  Goal: Achieve highest/safest level of mobility/gait  Description: Interventions:  - Assess patient's functional ability and stability  - Promote increasing activity/tolerance for mobility and gait  - Educate and engage patient/family in tolerated activity level and precautions    Outcome: Progressing     Problem: PAIN - ADULT  Goal: Verbalizes/displays adequate comfort level or patient's stated pain goal  Description: INTERVENTIONS:  - Encourage pt to monitor pain and request assistance  - Assess pain using appropriate pain scale  - Administer analgesics based on type and severity of pain and evaluate response  - Implement non-pharmacological measures as appropriate and evaluate response  - Consider cultural and social influences on pain and pain management  - Manage/alleviate anxiety  - Utilize distraction and/or relaxation techniques  - Monitor for opioid side effects  - Notify MD/LIP if interventions unsuccessful or patient reports new  pain  - Anticipate increased pain with activity and pre-medicate as appropriate  Outcome: Progressing     Problem: SAFETY ADULT - FALL  Goal: Free from fall injury  Description: INTERVENTIONS:  - Assess pt frequently for physical needs  - Identify cognitive and physical deficits and behaviors that affect risk of falls.  - [place] fall precautions as indicated by assessment.  - Educate pt/family on patient safety including physical limitations  - Instruct pt to call for assistance with activity based on assessment  - Modify environment to reduce risk of injury  - Provide assistive devices as appropriate  - Consider OT/PT consult to assist with strengthening/mobility  - Encourage toileting schedule  Outcome: Progressing     Problem: DISCHARGE PLANNING  Goal: Discharge to home or other facility with appropriate resources  Description: INTERVENTIONS:  - Identify barriers to discharge w/pt and caregiver  - Include patient/family/discharge partner in discharge planning  - Arrange for needed discharge resources and transportation as appropriate  - Identify discharge learning needs (meds, wound care, etc)  - Arrange for interpreters to assist at discharge as needed  - Consider post-discharge preferences of patient/family/discharge partner  - Complete POLST form as appropriate  - Assess patient's ability to be responsible for managing their own health  - Refer to Case Management Department for coordinating discharge planning if the patient needs post-hospital services based on physician/LIP order or complex needs related to functional status, cognitive ability or social support system  Outcome: Progressing

## 2024-11-24 VITALS
DIASTOLIC BLOOD PRESSURE: 75 MMHG | HEIGHT: 67 IN | HEART RATE: 60 BPM | BODY MASS INDEX: 28.41 KG/M2 | WEIGHT: 181 LBS | TEMPERATURE: 98 F | OXYGEN SATURATION: 98 % | RESPIRATION RATE: 16 BRPM | SYSTOLIC BLOOD PRESSURE: 114 MMHG

## 2024-11-24 PROCEDURE — 99214 OFFICE O/P EST MOD 30 MIN: CPT | Performed by: HOSPITALIST

## 2024-11-24 NOTE — PLAN OF CARE
Plan is to discharge Sunday.   Problem: Patient Centered Care  Goal: Patient preferences are identified and integrated in the patient's plan of care  Description: Interventions:  - What would you like us to know as we care for you?   - Provide timely, complete, and accurate information to patient/family  - Incorporate patient and family knowledge, values, beliefs, and cultural backgrounds into the planning and delivery of care  - Encourage patient/family to participate in care and decision-making at the level they choose  - Honor patient and family perspectives and choices  Outcome: Progressing     Problem: Patient/Family Goals  Goal: Patient/Family Long Term Goal  Description: Patient's Long Term Goal:     Interventions:  -   - See additional Care Plan goals for specific interventions  Outcome: Progressing  Goal: Patient/Family Short Term Goal  Description: Patient's Short Term Goal:     Interventions:   -  See additional Care Plan goals for specific interventions  Outcome: Progressing     Problem: SKIN/TISSUE INTEGRITY - ADULT  Goal: Skin integrity remains intact  Description: INTERVENTIONS  - Assess and document risk factors for pressure ulcer development  - Assess and document skin integrity  - Monitor for areas of redness and/or skin breakdown  - Initiate interventions, skin care algorithm/standards of care as needed  Outcome: Progressing  Goal: Incision(s), wounds(s) or drain site(s) healing without S/S of infection  Description: INTERVENTIONS:  - Assess and document risk factors for pressure ulcer development  - Assess and document skin integrity  - Assess and document dressing/incision, wound bed, drain sites and surrounding tissue  - Implement wound care per orders  - Initiate isolation precautions as appropriate  - Initiate Pressure Ulcer prevention bundle as indicated  Outcome: Progressing     Problem: MUSCULOSKELETAL - ADULT  Goal: Return mobility to safest level of function  Description:  INTERVENTIONS:  - Assess patient stability and activity tolerance for standing, transferring and ambulating w/ or w/o assistive devices  - Assist with transfers and ambulation using safe patient handling equipment as needed  - Ensure adequate protection for wounds/incisions during mobilization  - Obtain PT/OT consults as needed  - Advance activity as appropriate  - Communicate ordered activity level and limitations with patient/family  Outcome: Progressing  Goal: Maintain proper alignment of affected body part  Description: INTERVENTIONS:  - Support and protect limb and body alignment per provider's orders  - Instruct and reinforce with patient and family use of appropriate assistive device and precautions (e.g. spinal or hip dislocation precautions)  Outcome: Progressing

## 2024-11-24 NOTE — DISCHARGE PLANNING
Tiffanie is A&Ox4 on room air. She is POD3. Ambulating x1 with a walker. Discharging home today. Discharge instructions have been given to her and parents. All questions have been answered.

## 2024-11-24 NOTE — DISCHARGE INSTRUCTIONS
Wound Care  Do not change or remove your initial postoperative dressing for the first 2 days after surgery   unless instructed to do so by Dr. Mcdaniel's staff or if it is saturated by drainage. If removed,   please reapply a clean dry dressing over the incision. You may have thin adhesive paper strips   on your incision after surgery--please do NOT remove them. They will be removed at your 2-  week postoperative appointment. You may remove your dressing and shower on the third day   after surgery. Let warm soapy water run over the incisional area. Do not scrub the area.   Gently pat the incisional area completely dry. You may reapply a clean dry dressing over the   incision to prevent irritation from clothing, but this is not required after post op day 3. You are   not allowed to soak your incision in a bathtub, hot tub, or swimming pool until the incision is   completely healed and Dr. Mcdaniel or his staff permit these activities. Do not apply antibiotic   gels, ointments (Neosporin or bacitracin), lotions, peroxide or iodine solutions on or around the   Incision.  You may have some swelling and/or clear yellow drainage around your incisional site. This is   normal and may take several weeks to go away. Please leave any superficial scabs alone and let   them fall off on their own.    Immediate Follow Up  If you notice incisional redness or increased swelling, continuous clear drainage or change in   color to the drainage, malodor, significant leg swelling, increased pain and/or a fever greater   than 101.5, you should call our office. If it is after business hours you should present to the   closest emergency room. If you have shortness of breath, chest pain, significant stomach pain   and bloating without a bowel movement, complete loss of bowel or bladder function please   contact our office and present to your local emergency room as soon as possible.    Post-Operative Instructions - Lumbar  After surgery you may  experience pain in or around the region of the incision. Some buttock   and leg pain as well as tingling or numbness may also be present. Initially it may be of greater   intensity than before surgery but will usually subside over time as the healing process occurs.   This discomfort is caused from surgical retraction of tissue as well as inflammation and swelling   of previously compressed nerves.  Early activity after surgery is extremely important to help prevent the complications such as   pneumonia and blood clots. Activity also promotes recovery, relieves muscle stiffness, allows   for development of a well-organized scar, and improves your outcome. Your recovery is an   essential part of the surgical process so please follow the guidelines below to return to your   desired activities as safely as possible.     Week 1  ? Try to get at least 8 hours of sleep each night. A disrupted sleep pattern is common   after discharge from the hospital and will return to normal over time.  ? Avoid bending and twisting at the waist. No bending more than what is required to get   dressed. No twisting more than required to toilet (wipe yourself).  ? No sitting for more than 1 hour at a time. Walk and/or change position before returning   to a sitting position.   ? You may not drive, but you may be driven.  ? Begin a daily walking program with 1-2 blocks initially; schedule a daily time and   increase distance daily. Ideally, we would like you to be up and walking 15   minutes/hour.   ? Eat a balanced high-fiber diet and drink plenty of water.  ? Take medications as prescribed, using narcotics and muscle relaxants only as needed.  ? Do not restart any NSAIDs such as Advil, Motrin, Aleve, ibuprofen, naproxen, diclofenac,   meloxicam or celecoxib for at least 3 months after your fusion surgery.   ? Take stool softeners to help with bowel movements.    Week 2  ? Resume normal rising and retiring schedule but continue to rest  throughout the day as   needed.  ? You may not drive, but you may be driven.  ? No lifting of anything weighing more than 10 to 15 pounds.  ? Continue scheduled walking, increasing distance and frequency as tolerated.  ? May resume sexual relations when comfortable between 2 to 4 weeks after surgery.  Please be advised the patient must be in the top position until 6 weeks after surgery.   ? Begin weaning from narcotic pain medications if you have not already.  ? Follow-up in the office as scheduled for further instructions.     Disability  The usual period of recovery for Lumbar Spinal Fusion surgery is 8 to 12 weeks and complete   healing may take from 6 to 9 months. Some patients may return to work sooner than others   depending on their job, response to surgery, and ability to perform other lighter tasks in the   workplace. Physician approval is required prior to returning to work.    Post-Operative Pain Medication Policy  It is Dr. Mcdaniel's aim to make you as comfortable as possible after surgery. We realize pain   management is not perfect, and that you will have some discomfort after your operation.   There are several factors that limit our ability to completely eliminate pain after surgery. This   first is that pain medications have side effects. Please be advised that high doses or continued   use of narcotic medications may put you at risk for serious health issues including but not   limited to respiratory depression (decreased ability to breathe normally), hypotension (low   blood pressure), nausea and constipation, generalized itching, urinary retention (inability to   urinate) and abdominal distention.   Dr. Mcdaniel will prescribe pain medication for 2-3 weeks after surgery and may refer you out to a   pain management specialist for any narcotic medication requested after this period of time.    Preventing Opioid Addiction  [facility] is committed to protecting our patients from Opioid  addiction.   We only prescribe opioids when necessary. Whenever possible, we use less-addictive pain   medication and alternative pain management options. Both high-dosage opioids and lowdosage opioids taken over long periods of time can increase the risk of addiction. We attempt to limit our prescriptions of opioids after surgery as much as possible, which may include lower   dosages of opioid medications or fewer pills. If you have additional questions about Opioids   and their dependency, please contact our clinic.    Contact Information  Please contact Dr. Jonas Kyle’s , at [phone] with any   questions or concerns pertaining to scheduling or other administrative issues.  Please contact Lona Simon PA-C, Dr. Mcdaniel’s physician assistant, at [phone] with   any medical questions or concerns.     AFTER HOURS EMERGENCY CONTACT: Please dial [phone] and press “0”  for the  to connect you to the orthopaedic fellow or resident on call if you have any   urgent questions or concerns after regular business hours. If you do not hear back from the oncall physician in a timely matter and your urgent matter turns emergent, you should present to   your local emergency room. Please follow-up with Lona Simon PA-C the following business   day with an update if you do have to contact the on-call physician or present to your local   emergency room after surgery.     Future Dental Appointments  (3 Months following surgery): Prior to going to the dentist, please notify Dr. Mcdaniel and let your   Dentist know that you had a spinal fusion surgery. Have the treating physician prescribe   antibiotics prior to the procedure. The appropriate medications and guidelines are as follows:  ? Amoxicillin 2 grams 1 hour prior to procedure. If allergic to Penicillin, Clindamycin 600   mg, 1 hour prior to procedure. No second doses are required following the dental   procedure

## 2024-11-24 NOTE — PROGRESS NOTES
[facility]  part of [facility]    Progress Note    Tiffanie Graham Patient Status:  Inpatient    2000 MRN [id number]   Location [facility] 4W/SW/SE Attending Oswaldo Mcdaniel MD   Hosp Day # 0 PCP Zarina Fay     SUBJECTIVE:  Interval History: Patient improved    She denies any fever, chills, gross bowel/bladder incontinence or saddle anesthesia. They also deny calf pain, cramping, chest pain, difficulty breathing or shortness of breath.     Post-Op Day: 3    OBJECTIVE:  Blood pressure 114/78, pulse 76, temperature 98 °F (36.7 °C), temperature source Oral, resp. rate 16, height 5' 7\" (1.702 m), weight 181 lb (82.1 kg), last menstrual period 2024, SpO2 97%.     Ortho Spine Exam:  Incisional dressings are clean, dry and intact.  Adjacent skin is without erythema or edema.  Motor exam is 4/5 left EHL and tib ant, otherwise 5/5 to bilateral LE.  2+ dorsalis pedis and posterior tibialis pulses.  Sensation is intact distally. Calves are soft and non-tender to palpation.      ASSESSMENT/PLAN:    S/P L5-S1 ALIF with PSF     Feeling good home today    1) Continue current pain management protocol.   2) Plan to discharge home today  3) Post operative medications were sent to patient's pharmacy outside of [facility]   4) Plan to have patient remove their dressing on POD#3  5) Follow up in clinic in 2 to 3 weeks, appointment should already be scheduled, please have patient call [phone] to confirm or change date/location.   6) All questions answered by the bedside today     Herman Arauz MD

## 2024-11-24 NOTE — PLAN OF CARE
Patient is A&Ox4. VSS. POD#2-L5-S1 anterior lumbar interbody fusion with posterior spinal fusion with Dr. Mcdaniel. Dermabond, telfa, and tegaderm to incisions anteriorly and posteriorly. Patient is up SBA with a walker. She is continent. Plan is for home tomorrow. Call light and personal belongings are within reach.   Problem: Patient Centered Care  Goal: Patient preferences are identified and integrated in the patient's plan of care  Description: Interventions:  - What would you like us to know as we care for you?   - Provide timely, complete, and accurate information to patient/family  - Incorporate patient and family knowledge, values, beliefs, and cultural backgrounds into the planning and delivery of care  - Encourage patient/family to participate in care and decision-making at the level they choose  - Honor patient and family perspectives and choices  Outcome: Progressing     Problem: Patient/Family Goals  Goal: Patient/Family Long Term Goal    Interventions:  - See additional Care Plan goals for specific interventions  Outcome: Progressing  Goal: Patient/Family Short Term Goal    Interventions:   - See additional Care Plan goals for specific interventions  Outcome: Progressing     Problem: SKIN/TISSUE INTEGRITY - ADULT  Goal: Skin integrity remains intact  Description: INTERVENTIONS  - Assess and document risk factors for pressure ulcer development  - Assess and document skin integrity  - Monitor for areas of redness and/or skin breakdown  - Initiate interventions, skin care algorithm/standards of care as needed  Outcome: Progressing  Goal: Incision(s), wounds(s) or drain site(s) healing without S/S of infection  Description: INTERVENTIONS:  - Assess and document risk factors for pressure ulcer development  - Assess and document skin integrity  - Assess and document dressing/incision, wound bed, drain sites and surrounding tissue  - Implement wound care per orders  - Initiate isolation precautions as  appropriate  - Initiate Pressure Ulcer prevention bundle as indicated  Outcome: Progressing     Problem: MUSCULOSKELETAL - ADULT  Goal: Return mobility to safest level of function  Description: INTERVENTIONS:  - Assess patient stability and activity tolerance for standing, transferring and ambulating w/ or w/o assistive devices  - Assist with transfers and ambulation using safe patient handling equipment as needed  - Ensure adequate protection for wounds/incisions during mobilization  - Obtain PT/OT consults as needed  - Advance activity as appropriate  - Communicate ordered activity level and limitations with patient/family  Outcome: Progressing  Goal: Maintain proper alignment of affected body part  Description: INTERVENTIONS:  - Support and protect limb and body alignment per provider's orders  - Instruct and reinforce with patient and family use of appropriate assistive device and precautions (e.g. spinal or hip dislocation precautions)  Outcome: Progressing     Problem: Impaired Functional Mobility  Goal: Achieve highest/safest level of mobility/gait  Description: Interventions:  - Assess patient's functional ability and stability  - Promote increasing activity/tolerance for mobility and gait  - Educate and engage patient/family in tolerated activity level and precautions  Outcome: Progressing     Problem: PAIN - ADULT  Goal: Verbalizes/displays adequate comfort level or patient's stated pain goal  Description: INTERVENTIONS:  - Encourage pt to monitor pain and request assistance  - Assess pain using appropriate pain scale  - Administer analgesics based on type and severity of pain and evaluate response  - Implement non-pharmacological measures as appropriate and evaluate response  - Consider cultural and social influences on pain and pain management  - Manage/alleviate anxiety  - Utilize distraction and/or relaxation techniques  - Monitor for opioid side effects  - Notify MD/LIP if interventions unsuccessful  or patient reports new pain  - Anticipate increased pain with activity and pre-medicate as appropriate  Outcome: Progressing     Problem: SAFETY ADULT - FALL  Goal: Free from fall injury  Description: INTERVENTIONS:  - Assess pt frequently for physical needs  - Identify cognitive and physical deficits and behaviors that affect risk of falls.  - [place] fall precautions as indicated by assessment.  - Educate pt/family on patient safety including physical limitations  - Instruct pt to call for assistance with activity based on assessment  - Modify environment to reduce risk of injury  - Provide assistive devices as appropriate  - Consider OT/PT consult to assist with strengthening/mobility  - Encourage toileting schedule  Outcome: Progressing     Problem: DISCHARGE PLANNING  Goal: Discharge to home or other facility with appropriate resources  Description: INTERVENTIONS:  - Identify barriers to discharge w/pt and caregiver  - Include patient/family/discharge partner in discharge planning  - Arrange for needed discharge resources and transportation as appropriate  - Identify discharge learning needs (meds, wound care, etc)  - Arrange for interpreters to assist at discharge as needed  - Consider post-discharge preferences of patient/family/discharge partner  - Complete POLST form as appropriate  - Assess patient's ability to be responsible for managing their own health  - Refer to Case Management Department for coordinating discharge planning if the patient needs post-hospital services based on physician/LIP order or complex needs related to functional status, cognitive ability or social support system  Outcome: Progressing

## 2024-11-24 NOTE — PHYSICAL THERAPY NOTE
PHYSICAL THERAPY TREATMENT NOTE - INPATIENT     Room Number: 406/406-A       Presenting Problem: s/p L5-S1 ALIF  Co-Morbidities : Anxiety state     GUDELIA   Asthma (HCC)   Back problem    Unknown etiology   Cancer (HCC)    Esthesio Neuroblastoma   Disorder of thyroid   Exposure to medical diagnostic radiation    Completed course treatment   Muscle weakness    Due to back problems   Personal history of antineoplastic chemotherapy    Completes course treatment   PONV (postoperative nausea and vomiting)    Problem List  Active Problems:    Spinal stenosis of lumbar region with neurogenic claudication    Anxiety    S/P lumbar spinal fusion      PHYSICAL THERAPY ASSESSMENT   Patient demonstrates good  progress this session, goals  remain in progress.      Patient is requiring stand-by assist as a result of the following impairments: decreased functional strength, decreased endurance/aerobic capacity, pain, and decreased muscular endurance.     Patient continues to function below baseline with bed mobility, transfers, gait, stair negotiation, and standing prolonged periods.  Next session anticipate patient to progress bed mobility, transfers, gait, stair negotiation, and standing prolonged periods.  Physical Therapy will continue to follow patient for duration of hospitalization.    Patient continues to benefit from continued skilled PT services: with no additional skilled services but increased support at home.    PLAN DURING HOSPITALIZATION  Nursing Mobility Recommendation : 1 Assist     PT Treatment Plan: Bed mobility;Body mechanics;Coordination;Endurance;Energy conservation;Patient education;Gait training;Strengthening;Stoop training;Stair training;Transfer training;Balance training  Frequency (Obs): Daily     SUBJECTIVE  Pt was agreeable to therapy session.         OBJECTIVE  Precautions: Spine    WEIGHT BEARING RESTRICTION       PAIN ASSESSMENT   Rating:  (did not rate)  Location: back  Management Techniques: Activity  promotion;Body mechanics;Relaxation;Repositioning    BALANCE  Static Sitting: Good  Dynamic Sitting: Good  Static Standing: Fair +  Dynamic Standing: Fair    ACTIVITY TOLERANCE                          O2 WALK       AM-PAC '6-Clicks' INPATIENT SHORT FORM - BASIC MOBILITY  How much difficulty does the patient currently have...  Patient Difficulty: Turning over in bed (including adjusting bedclothes, sheets and blankets)?: A Little   Patient Difficulty: Sitting down on and standing up from a chair with arms (e.g., wheelchair, bedside commode, etc.): A Little   Patient Difficulty: Moving from lying on back to sitting on the side of the bed?: A Little   How much help from another person does the patient currently need...   Help from Another: Moving to and from a bed to a chair (including a wheelchair)?: A Little   Help from Another: Need to walk in hospital room?: A Little   Help from Another: Climbing 3-5 steps with a railing?: None     AM-PAC Score:  Raw Score: 19   Approx Degree of Impairment: 41.77%   Standardized Score (AM-PAC Scale): 45.44   CMS Modifier (G-Code): CK    FUNCTIONAL ABILITY STATUS  Functional Mobility/Gait Assessment  Gait Assistance: Supervision  Distance (ft): 500'  Assistive Device: Rolling walker  Pattern: Within Functional Limits  Stairs: Stairs  How Many Stairs: 6  Device: 1 Rail  Assist: Modified independent  Pattern: Ascend and Descend  Ascend and Descend : Step to  Rolling:  NT  Supine to Sit:  NT  Sit to Supine:  NT  Sit to Stand: stand-by assist    Skilled Therapy Provided: Pt is received in the chair and was cleared for therapy session.  Pt reviewed and maintained all her spinal precautions throughout the session. Pt is SBA with sit<>stand transfers with the RW. Pt was able to AMB about 500' with the RW SBA for balance and safety. Pt with decreased richard and step length with narrow SALOME but with very good balance and safety awareness. Pt then was educated and able to negotiate 6 stairs  with 1 HR Mod I for safety, Pt is cued for proper technique and sequencing. Pt with very good balance on the stairs. Returned pt back to the room and back to sitting in the chair with all needs within reach. Pt is on track to dc to home once medically cleared. Reported to the RN on the status of the pt.      The patient's Approx Degree of Impairment: 41.77% has been calculated based on documentation in the [facility] '6 clicks' Inpatient Daily Activity Short Form.  Research supports that patients with this level of impairment may benefit from Home with assist.  Final disposition will be made by interdisciplinary medical team.        Patient End of Session: Up in chair;Needs met;Call light within reach;RN aware of session/findings;All patient questions and concerns addressed;Hospital anti-slip socks    CURRENT GOALS   Goals to be met by: 12/6/24  Patient Goal Patient's self-stated goal is: to return home   Goal #1 Patient is able to demonstrate supine - sit EOB @ level: modified independent     Goal #1   Current Status NT received in th chair    Goal #2 Patient is able to demonstrate transfers Sit to/from Stand at assistance level: modified independent with least restrictive device     Goal #2  Current Status SBA  w/ RW   Goal #3 Patient is able to ambulate >150 feet with assist device: least restrictive device at assistance level: modified independent   Goal #3   Current Status 2x150 ft with RW sup   Goal #4 Patient will negotiate 6 stairs/one curb w/ assistive device and supervision   Goal #4   Current Status 6 stairs with one rail Mod I   Goal #5 Patient to demonstrate independence with home activity/exercise instructions provided to patient in preparation for discharge.   Goal #5   Current Status IN PROGRESS   Goal #6    Goal #6  Current Status        Therapeutic Activity: 23 minutes

## 2024-11-24 NOTE — DISCHARGE SUMMARY
[facility]  part of [facility]    Discharge Summary    Tiffanie Graham Patient Status:  Outpatient in a Bed    2000 MRN [id number]   Location [facility] 4W/SW/SE Attending Otoniel Thompson MD   Hosp Day # 0 PCP Zarina Fay     Date of Admission: 2024 Disposition: Home    Date of Discharge: 24    Admitting Diagnosis: Lumbar spondylolisthesis  S/P lumbar spinal fusion    Hospital Discharge Diagnoses: Lumbar spondylolisthesis s/p L5-S1 fusion    Lace+ Score: 8  59-90 High Risk  29-58 Medium Risk  0-28   Low Risk.    TCM Follow-Up Recommendation:  LACE < 29: Low Risk of readmission after discharge from the hospital. No TCM follow-up needed.    Problem List:   Patient Active Problem List   Diagnosis    Spinal stenosis of lumbar region with neurogenic claudication    Anxiety    S/P lumbar spinal fusion       Reason for Admission: elective lumbar surgery    Physical Exam:   Vitals:    24 0728   BP: 114/75   Pulse: 60   Resp: 16   Temp: 97.9 °F (36.6 °C)   GENERAL:  Alert and oriented to time, place, and person.   HEENT:  Atraumatic.  Oropharynx clear.  Dry mucous membranes.  Ears and nose normal.  Eyes:  Anicteric sclerae.  NECK:  Supple.  No lymphadenopathy.  Trachea midline.  Full range of motion.  LUNGS:  Clear to auscultation bilaterally.  Normal respiratory effort.    HEART:  Regular rate and rhythm.  S1 and S2 auscultated.  No murmur.  ABDOMEN:  Soft, nondistended.  No tenderness.  Positive bowel sounds.  Lower abdominal dressing and lumbar area dressing.  EXTREMITIES:  No peripheral edema, clubbing, or cyanosis.  NEUROLOGIC:  Motor and sensory intact.  Cranial nerves II through XII are intact      History of Present Illness:   Per Dr. Lomas  The patient is a 24-year-old  female with chronic back pain, lumbar anterolisthesis, and neurogenic claudication.  Failed outpatient conservative medical management options.  Scheduled today for  above-mentioned procedure by her spine surgeon, Dr. Mcdaniel.  Postoperatively transferred to PACU for further monitoring.       Hospital Course:   1.       Lumbar spondylolisthesis, status post L5-S1 anterior lumbar interbody fusion with posterior spinal fusion.    Pain control adequate  Rec'd PT/OT  Stable for dc     2.       Anxiety disorder.    Continue home medications.    Consultations: Ortho, Gen surgery    Procedures: see above    Complications: none    Discharge Condition: Stable    Discharge Medications:      Discharge Medications        START taking these medications        Instructions Prescription details   Naloxone HCl 4 MG/0.1ML Liqd      4 mg by Intranasal route as needed (May repeat as needed in other nostril if symptoms persist). If patient remains unresponsive, repeat dose in other nostril 2-5 minutes after first dose.   Quantity: 1 kit  Refills: 0            CONTINUE taking these medications        Instructions Prescription details   acetaminophen 500 MG Tabs  Commonly known as: Tylenol Extra Strength      Take 2 tablets (1,000 mg total) by mouth every 6 (six) hours as needed for Pain.   Refills: 0     amphetamine-dextroamphetamine 5 MG Tabs  Commonly known as: Adderall      Take 1 tablet by mouth 2 (two) times daily as needed.   Refills: 0     cholecalciferol 1.25 MG (08332 UT) Caps  Commonly known as: Vitamin D3      once a week. Tuesdays   Refills: 0     clonazePAM 1 MG Tabs  Commonly known as: KlonoPIN      Take 1 tablet (1 mg total) by mouth nightly.   Refills: 0     cyclobenzaprine 10 MG Tabs  Commonly known as: Flexeril      Take 1 tablet (10 mg total) by mouth 3 (three) times daily as needed.   Refills: 0     escitalopram 20 MG Tabs  Commonly known as: Lexapro      Take 2 tablets (40 mg total) by mouth at bedtime.   Refills: 0     famotidine 40 MG Tabs  Commonly known as: Pepcid      Take 1 tablet (40 mg total) by mouth nightly.   Refills: 0     fluticasone propionate 50 MCG/ACT  Susp  Commonly known as: Flonase      2 sprays 2 (two) times daily.   Refills: 0     gabapentin 400 MG Caps  Commonly known as: Neurontin      3 (three) times daily.   Refills: 0     nabumetone 750 MG Tabs  Commonly known as: Relafen      Take 1 tablet (750 mg total) by mouth in the morning and 1 tablet (750 mg total) before bedtime.   Refills: 0     Ponaris Soln      1-2 sprays by Nasal route daily as needed.   Refills: 0     spironolactone 50 MG Tabs  Commonly known as: Aldactone      Take 1 tablet (50 mg total) by mouth 2 (two) times daily.   Refills: 0     topiramate 50 MG Tabs  Commonly known as: TOPAMAX      Take 1.5 tablets (75 mg total) by mouth daily. Takes 25 mg in AM and 50 mg at HS   Refills: 0               Where to Get Your Medications        These medications were sent to [facility][id number] [place], [place] - [address] AT [facility] [address], [phone], [phone]  [address] [facility] [address]      Phone: [phone]   Naloxone HCl 4 MG/0.1ML Liqd         Greater than 35 minutes spent, >50% spent counseling re: treatment plan and workup     Otoniel Thompson MD  11/24/2024

## 2024-11-25 NOTE — OPERATIVE REPORT
OPERATIVE REPORT    DATE OF SURGERY   11/21/24    PATIENT   Tiffanie Graham    PREOPERATIVE DIAGNOSIS    Isthmic spondyolisthesis L5-S1  Left lumbar radiculopathy    POST OPERATIVE DIAGNOSIS   Same    OPERATION  1. Anterior interbody fusion L5-S1  2. Anterior instrumentation L5-S1  3. Posterior non-segmental instrumentation L5-S1  4. Posterior lumbar fusion L5-S1  5. Left L5-S1 hemilaminotomy, foraminotomy  6. Fluoroscopy  7. Use of microscope      SURGEON  Oswaldo Mcdaniel MD    COSURGEON  Merrick Valderrama MD    ASSISTANT  FEDERICO [place]    ANESTHESIA  General    EBL  50cc    COMPLICATIONS  None immediate    DRAINS  None    ACTIVITY  Up as tolerated    IMPLANT  Nuvasive Brigade  Nuvasive anterior instrumentation  Nuvasive reline pedicle screws      INDICATIONS   The patient is a 24 year old female with bilateral  lower extremity   radiculopathy. Patient failed a course of physical therapy as well   as epidural injections. He had disk degeneration at 5-S1al stenosis.  Anterior surgery with posterior fixation was discussed. The   patient understood the risks of anterior spinal surgery. Risks and benefits of the procedure was   discussed with the patient which included, but was not limited   to, large vessel injury, bleeding, infection, nerve injury, epidural hematoma,    durotomy as well as complications associated with general   anesthesia (Stroke, myocardial infarction, DVT, PE). The patient   understood/consented and was taken to the operating room. Due to the   complexity of the case, a surgical assistant was required for retraction, implant placement.         TECHNIQUE Ancef 2g was given preoperatively. Upon successful   intubation, the patient was transferred onto the regular   operating room table in a supine position. All pressure points   were well padded. The abdomen was prepped and draped in the normal sterile fashion. A lateral fluoroscopic image was used to localize at L5-S1   interspace by Dr. Valderrama. A left  sided retroperitoneal approach was used.  Following placement of the abdominal retractors at the appropriate level a   lateral fluoroscopic image was taken to confirm level. Excellent hemostasis was achieved.      After localization, an annulotomy was then made. A combination of straight and curved   curretes were used to remove the disk material and cartilaginous   end plates. Fluoroscopy was used to confirm appropriate depth of the endplate. Trial cages were placed and checked under fluoroscopy.    A PEEK intervertebral cage was then   appropriately sized. The PEEK cage was then fashioned into place   and packed with local bone graft that had been obtained from the   anterior osteophyte resection as well as allograft. The intervertebral cage was then   secured into the vertebral bodies using anterior spinal   instrumentation with screws placed into the L5 and S1 vertebral   bodies measuring. Excellent fixation was obtained and was felt to be adequate. The wound was then copiously   irrigated. The retractors were removed and the vessels were inspected and hemostasis was   maintained. The abdominal fascia was closed with #0 looped PDS was used. 2-0 vicryl was   used or the deep dermal and 4-0 monocryl for subcuticular layer. Dermabond was applied. A sterile dressing was    applied.    The patient was then positioned prone on the Chase spinal frame. Care was taken to   ensure bony prominences were well padded. The lower back was   prepped and draped in the usual fashion for surgery and local   anesthetic was infiltrated into the paraspinal area. Under   fluoroscopy, an incision was made immediately lateral to the L5   and S1 pedicles under biplane fluoroscopy, a Jamshidi was   advanced from the lateral aspect of the pedicle down the pedicle   into the vertebral body. A guidewire was then advanced down the   Jamshidi into the vertebral body under biplanar fluorosopy. This was performed in identical fashion at all  pedicles. At this point, the incision   connecting the guidewire was made. A tubular retractor was placed and the lamina was exposed and medial facet. This was removed with a leticia and the ligamentum was removed with a kerosin. The dura was exposed. This was continued through   fascia with Bovie dissection. The dilators were advanced over   the guidewires and pedicle screws at L5, and at S1  were then advanced over the guidewire under fluoroscopy. In   addition, live EMG monitoring was performed as the screws were   Advanced. No neural stimulation was   noted. All  pedicle screws were well positioned in the same   fashion. A юлия was then advanced down the connectors   into the heads of the pedicle screws. The set screws   were then fastened securely. The extenders were then removed and   the pedicle screw юлия constructs was seen to be well positioned   on fluoroscopy.     The wound was thoroughly irrigated. Hemostasis was ensured.   The fascia was approximated with an 0 Vicryl suture, subcutaneous   tissue, and skin was then approximated. Sterile dressing   applied. The patient returned to recovery in stable condition.

## (undated) DEVICE — ANTIBACTERIAL UNDYED BRAIDED (POLYGLACTIN 910), SYNTHETIC ABSORBABLE SUTURE: Brand: COATED VICRYL

## (undated) DEVICE — KIT SUR ACCS MAXCESS

## (undated) DEVICE — SPONGE LAP 18X18IN WHT COT 4 PLY FLD STRUNG

## (undated) DEVICE — INSULATED BLADE ELECTRODE: Brand: EDGE

## (undated) DEVICE — GLOVE SUR 6.5 SENSICARE PI MIC PIP CRM PWD F

## (undated) DEVICE — DISPOSABLE SLIM BIPOLAR FORCEPS, NON-STICK,: Brand: SPETZLER-MALIS

## (undated) DEVICE — CLIP APPLIER: Brand: PREMIUM SURGICLIP II

## (undated) DEVICE — SPONGE: SPECIALTY PEANUT XR 100/CS: Brand: MEDICAL ACTION INDUSTRIES

## (undated) DEVICE — 3M™ STERI-DRAPE™ U-DRAPE 1015: Brand: STERI-DRAPE™

## (undated) DEVICE — SHEET,DRAPE,53X77,STERILE: Brand: MEDLINE

## (undated) DEVICE — Device

## (undated) DEVICE — PACK CDS LAMINECTOMY

## (undated) DEVICE — GLOVE SUR 6.5 SENSICARE PI PIP GRN PWD F

## (undated) DEVICE — TRAY INSTR PWR NUVASIVE

## (undated) DEVICE — KIT HEMSTAT MTRX 8ML PORCINE GEL HUM THROM

## (undated) DEVICE — SPONGE 4X4 10PK

## (undated) DEVICE — GLOVE SUR 7 SENSICARE PI MIC PIP CRM PWD F

## (undated) DEVICE — SUT MCRYL 3-0 27IN ABSRB UD L24MM PS-1

## (undated) DEVICE — FORCEP CUSH BAY BP DISP 7.5IN

## (undated) DEVICE — WRAP COOLING BACK W/NO PILLOW

## (undated) DEVICE — TUBING MEGADYNE SPECULUM

## (undated) DEVICE — MONITORING NEUROPHYSIOLOGICAL

## (undated) DEVICE — SUT MCRYL 3-0 18IN PS-2 ABSRB UD 19MM 3/8 CIR

## (undated) DEVICE — NEEDLE NRV STIM BVL TIP INSUL PEDCL ACCS SYS

## (undated) DEVICE — PAD,NON-ADHERENT,3X8,STERILE,LF,1/PK: Brand: MEDLINE

## (undated) DEVICE — COVER BK TBL L72IN BLU PD HVY DTY BK TBL CVR

## (undated) DEVICE — SUT COAT VCRL + 2-0 27IN ABSRB UD ANTIBACT CT-1

## (undated) DEVICE — GOWN,SIRUS,FAB REINF,RAGLAN,L,STERILE: Brand: MEDLINE

## (undated) DEVICE — TRAY CATH FOLEY 16FR INCLUDE BARDX IC COMPLT CARE

## (undated) DEVICE — ADHESIVE SKIN TOP FOR WND CLSR DERMBND ADV

## (undated) DEVICE — 3.0MM PRECISION NEURO (MATCH HEAD)

## (undated) DEVICE — SUT COAT VCRL 2-0 18IN CT-1 ABSRB UD CR 36MM

## (undated) DEVICE — SUT PDS II 0 L60IN ABSRB VLT L48MM CTX 1/2

## (undated) DEVICE — COVER,TABLE,60X90,STERILE: Brand: MEDLINE

## (undated) DEVICE — 3M™ TEGADERM™ TRANSPARENT FILM DRESSING FRAME STYLE, 1626W, 4 IN X 4-3/4 IN (10 CM X 12 CM), 50/CT 4CT/CASE: Brand: 3M™ TEGADERM™

## (undated) DEVICE — SOLUTION IRRIG 1000ML 0.9% NACL USP BTL

## (undated) DEVICE — GOWN,SIRUS,FAB REINF,RAGLAN,XL,STERILE: Brand: MEDLINE

## (undated) DEVICE — GLOVE SUR 7 SENSICARE PI PIP GRN PWD F

## (undated) DEVICE — PACK ANTERIOR POSTERIOR ADD ON

## (undated) DEVICE — C-ARMOR C-ARM EQUIPMENT COVERS CLEAR STERILE UNIVERSAL FIT 12 PER CASE: Brand: C-ARMOR

## (undated) DEVICE — INTENDED USE FOR SURGICAL MARKING ON INTACT SKIN, ALSO PROVIDES A PERMANENT METHOD OF IDENTIFYING OBJECTS IN THE OPERATING ROOM: Brand: WRITESITE® PLUS MINI PREP RESISTANT MARKER

## (undated) DEVICE — GLOVE SUR 8.5 SENSICARE PI MIC PIP CRM PWD F

## (undated) DEVICE — SNAP KOVER: Brand: UNBRANDED

## (undated) DEVICE — DRAPE,ABDOMINAL,MAJOR,STERILE: Brand: MEDLINE

## (undated) NOTE — LETTER
[facility] [address], [place], [place]    Authorization for Surgical Operation and Procedure                               I hereby authorize Oswaldo Mcdaniel MD, and Merrick Valderrama MD, my physician and his/her assistants (if applicable), which may include medical students, residents, and/or fellows, to perform the following surgical operation/ procedure and administer such anesthesia as may be determined necessary by my physician: Operation/Procedure name (s) L5-S1 anterior lumbar interbody fusion with posterior spinal fusion on Tiffanie Graham   2.   I recognize that during the surgical operation/procedure, unforeseen conditions may necessitate additional or different procedures than those listed above.  I, therefore, further authorize and request that the above-named surgeon, assistants, or designees perform such procedures as are, in their judgment, necessary and desirable.    3.   My surgeon/physician has discussed prior to my surgery the potential benefits, risks and side effects of this procedure; the likelihood of achieving goals; and potential problems that might occur during recuperation.  They also discussed reasonable alternatives to the procedure, including risks, benefits, and side effects related to the alternatives and risks related to not receiving this procedure.  I have had all my questions answered and I acknowledge that no guarantee has been made as to the result that may be obtained.    4.   Should the need arise during my operation/procedure, which includes change of level of care prior to discharge, I also consent to the administration of blood and/or blood products.  Further, I understand that despite careful testing and screening of blood or blood products by collecting agencies, I may still be subject to ill effects as a result of receiving a blood transfusion and/or blood products.  The following are some, but not all, of the potential risks that can occur: fever and  allergic reactions, hemolytic reactions, transmission of diseases such as Hepatitis, AIDS and Cytomegalovirus (CMV) and fluid overload.  In the event that I wish to have an autologous transfusion of my own blood, or a directed donor transfusion, I will discuss this with my physician.  Check only if Refusing Blood or Blood Products  I understand refusal of blood or blood products as deemed necessary by my physician may have serious consequences to my condition to include possible death. I hereby assume responsibility for my refusal and release the hospital, its personnel, and my physicians from any responsibility for the consequences of my refusal.    o  Refuse   5.   I authorize the use of any specimen, organs, tissues, body parts or foreign objects that may be removed from my body during the operation/procedure for diagnosis, research or teaching purposes and their subsequent disposal by hospital authorities.  I also authorize the release of specimen test results and/or written reports to my treating physician on the hospital medical staff or other referring or consulting physicians involved in my care, at the discretion of the Pathologist or my treating physician.    6.   I consent to the photographing or videotaping of the operations or procedures to be performed, including appropriate portions of my body for medical, scientific, or educational purposes, provided my identity is not revealed by the pictures or by descriptive texts accompanying them.  If the procedure has been photographed/videotaped, the surgeon will obtain the original picture, image, videotape or CD.  The hospital will not be responsible for storage, release or maintenance of the picture, image, tape or CD.    7.   I consent to the presence of a  or observers in the operating room as deemed necessary by my physician or their designees.    8.   I recognize that in the event my procedure results in extended X-Ray/fluoroscopy  time, I may develop a skin reaction.    9. If I have a Do Not Attempt Resuscitation (DNAR) order in place, that status will be suspended while in the operating room, procedural suite, and during the recovery period unless otherwise explicitly stated by me (or a person authorized to consent on my behalf). The surgeon or my attending physician will determine when the applicable recovery period ends for purposes of reinstating the DNAR order.  10. Patients having a sterilization procedure: I understand that if the procedure is successful the results will be permanent and it will therefore be impossible for me to inseminate, conceive, or bear children.  I also understand that the procedure is intended to result in sterility, although the result has not been guaranteed.   11. I acknowledge that my physician has explained sedation/analgesia administration to me including the risk and benefits I consent to the administration of sedation/analgesia as may be necessary or desirable in the judgment of my physician.    I CERTIFY THAT I HAVE READ AND FULLY UNDERSTAND THE ABOVE CONSENT TO OPERATION and/or OTHER PROCEDURE.     ____________________________________  _________________________________        ______________________________  Signature of Patient    Signature of Responsible Person                Printed Name of Responsible Person                                      ____________________________________  _____________________________                ________________________________  Signature of Witness        Date  Time         Relationship to Patient    STATEMENT OF PHYSICIAN My signature below affirms that prior to the time of the procedure; I have explained to the patient and/or his/her legal representative, the risks and benefits involved in the proposed treatment and any reasonable alternative to the proposed treatment. I have also explained the risks and benefits involved in refusal of the proposed treatment and  alternatives to the proposed treatment and have answered the patient's questions. If I have a significant financial interest in a co-management agreement or a significant financial interest in any product or implant, or other significant relationship used in this procedure/surgery, I have disclosed this and had a discussion with my patient.     _____________________________________________________              _____________________________  (Signature of Physician)                                                                                         (Date)                                   (Time)  Patient Name: Tiffanie Graham      : 2000      Printed: 2024     Medical Record #: [id number]                                      Page 1 of 1